# Patient Record
Sex: FEMALE | Race: WHITE | NOT HISPANIC OR LATINO | RURAL
[De-identification: names, ages, dates, MRNs, and addresses within clinical notes are randomized per-mention and may not be internally consistent; named-entity substitution may affect disease eponyms.]

---

## 2020-06-17 ENCOUNTER — OFFICE (OUTPATIENT)
Dept: RURAL CLINIC 3 | Facility: CLINIC | Age: 65
End: 2020-06-17
Payer: COMMERCIAL

## 2020-06-17 VITALS
HEIGHT: 66 IN | WEIGHT: 198 LBS | SYSTOLIC BLOOD PRESSURE: 151 MMHG | HEART RATE: 87 BPM | DIASTOLIC BLOOD PRESSURE: 85 MMHG

## 2020-06-17 DIAGNOSIS — K59.00 CONSTIPATION, UNSPECIFIED: ICD-10-CM

## 2020-06-17 DIAGNOSIS — R12 HEARTBURN: ICD-10-CM

## 2020-06-17 DIAGNOSIS — R10.11 RIGHT UPPER QUADRANT PAIN: ICD-10-CM

## 2020-06-17 PROCEDURE — 99203 OFFICE O/P NEW LOW 30 MIN: CPT | Performed by: NURSE PRACTITIONER

## 2020-06-17 RX ORDER — SORBITOL SOLUTION 70 %
SOLUTION, ORAL MISCELLANEOUS
Qty: 100 | Refills: 0 | Status: COMPLETED
Start: 2020-06-17 | End: 2020-07-29

## 2020-06-17 RX ORDER — OMEPRAZOLE 40 MG/1
40 CAPSULE, DELAYED RELEASE ORAL
Qty: 90 | Refills: 3 | Status: COMPLETED
Start: 2020-06-17 | End: 2023-05-23

## 2020-07-29 ENCOUNTER — OFFICE (OUTPATIENT)
Dept: RURAL CLINIC 3 | Facility: CLINIC | Age: 65
End: 2020-07-29
Payer: COMMERCIAL

## 2020-07-29 VITALS
HEART RATE: 86 BPM | SYSTOLIC BLOOD PRESSURE: 172 MMHG | HEIGHT: 66 IN | WEIGHT: 196 LBS | DIASTOLIC BLOOD PRESSURE: 109 MMHG

## 2020-07-29 DIAGNOSIS — R13.10 DYSPHAGIA, UNSPECIFIED: ICD-10-CM

## 2020-07-29 DIAGNOSIS — R10.11 RIGHT UPPER QUADRANT PAIN: ICD-10-CM

## 2020-07-29 DIAGNOSIS — R12 HEARTBURN: ICD-10-CM

## 2020-07-29 DIAGNOSIS — K59.00 CONSTIPATION, UNSPECIFIED: ICD-10-CM

## 2020-07-29 PROCEDURE — 99213 OFFICE O/P EST LOW 20 MIN: CPT | Performed by: NURSE PRACTITIONER

## 2021-07-21 ENCOUNTER — OFFICE (OUTPATIENT)
Dept: RURAL CLINIC 3 | Facility: CLINIC | Age: 66
End: 2021-07-21

## 2021-07-21 VITALS
HEIGHT: 66 IN | DIASTOLIC BLOOD PRESSURE: 75 MMHG | HEART RATE: 76 BPM | SYSTOLIC BLOOD PRESSURE: 138 MMHG | WEIGHT: 184 LBS

## 2021-07-21 DIAGNOSIS — R13.10 DYSPHAGIA, UNSPECIFIED: ICD-10-CM

## 2021-07-21 DIAGNOSIS — K59.00 CONSTIPATION, UNSPECIFIED: ICD-10-CM

## 2021-07-21 DIAGNOSIS — R12 HEARTBURN: ICD-10-CM

## 2021-07-21 PROCEDURE — 99214 OFFICE O/P EST MOD 30 MIN: CPT | Performed by: NURSE PRACTITIONER

## 2021-07-21 RX ORDER — OMEPRAZOLE 40 MG/1
40 CAPSULE, DELAYED RELEASE ORAL
Qty: 90 | Refills: 3 | Status: COMPLETED
Start: 2020-06-17 | End: 2023-05-23

## 2021-10-13 ENCOUNTER — ON CAMPUS - OUTPATIENT (OUTPATIENT)
Dept: RURAL HOSPITAL 3 | Facility: HOSPITAL | Age: 66
End: 2021-10-13

## 2021-10-13 DIAGNOSIS — R13.10 DYSPHAGIA, UNSPECIFIED: ICD-10-CM

## 2021-10-13 DIAGNOSIS — K31.7 POLYP OF STOMACH AND DUODENUM: ICD-10-CM

## 2021-10-13 DIAGNOSIS — K44.9 DIAPHRAGMATIC HERNIA WITHOUT OBSTRUCTION OR GANGRENE: ICD-10-CM

## 2021-10-13 DIAGNOSIS — K22.2 ESOPHAGEAL OBSTRUCTION: ICD-10-CM

## 2021-10-13 PROCEDURE — 43239 EGD BIOPSY SINGLE/MULTIPLE: CPT | Mod: 59 | Performed by: INTERNAL MEDICINE

## 2021-10-13 PROCEDURE — 43249 ESOPH EGD DILATION <30 MM: CPT | Performed by: INTERNAL MEDICINE

## 2021-11-09 ENCOUNTER — TRANSCRIBE ORDERS (OUTPATIENT)
Dept: ADMINISTRATIVE | Facility: HOSPITAL | Age: 66
End: 2021-11-09

## 2021-11-09 DIAGNOSIS — R13.10 DYSPHAGIA, UNSPECIFIED TYPE: Primary | ICD-10-CM

## 2021-11-16 DIAGNOSIS — R13.10 DYSPHAGIA, UNSPECIFIED TYPE: Primary | ICD-10-CM

## 2021-11-17 ENCOUNTER — LAB (OUTPATIENT)
Dept: LAB | Facility: HOSPITAL | Age: 66
End: 2021-11-17

## 2021-11-17 DIAGNOSIS — R13.10 DYSPHAGIA, UNSPECIFIED TYPE: ICD-10-CM

## 2021-11-17 PROCEDURE — C9803 HOPD COVID-19 SPEC COLLECT: HCPCS

## 2021-11-17 PROCEDURE — U0005 INFEC AGEN DETEC AMPLI PROBE: HCPCS

## 2021-11-17 PROCEDURE — U0004 COV-19 TEST NON-CDC HGH THRU: HCPCS

## 2021-11-18 ENCOUNTER — HOSPITAL ENCOUNTER (OUTPATIENT)
Dept: GENERAL RADIOLOGY | Facility: HOSPITAL | Age: 66
Discharge: HOME | End: 2021-11-18
Admitting: OTOLARYNGOLOGY

## 2021-11-18 DIAGNOSIS — R13.10 DYSPHAGIA, UNSPECIFIED TYPE: ICD-10-CM

## 2021-11-18 LAB — SARS-COV-2 ORF1AB RESP QL NAA+PROBE: NOT DETECTED

## 2021-11-18 PROCEDURE — 63710000001 BARIUM SULFATE 40 % SUSPENSION: Performed by: OTOLARYNGOLOGY

## 2021-11-18 PROCEDURE — 92611 MOTION FLUOROSCOPY/SWALLOW: CPT

## 2021-11-18 PROCEDURE — 74230 X-RAY XM SWLNG FUNCJ C+: CPT

## 2021-11-18 PROCEDURE — A9270 NON-COVERED ITEM OR SERVICE: HCPCS | Performed by: OTOLARYNGOLOGY

## 2021-11-18 RX ADMIN — BARIUM SULFATE 50 ML: 400 SUSPENSION ORAL at 10:09

## 2021-11-18 NOTE — MBS/VFSS/FEES
"Outpatient Speech Language Pathology   Adult Swallow Initial Evaluation   [facility]     Patient Name: Sneha Vega  : 1955  MRN: [id number]  Today's Date: 2021         Visit Date: 2021   There is no problem list on file for this patient.       No past medical history on file.     No past surgical history on file.      Visit Dx:     ICD-10-CM ICD-9-CM   1. Dysphagia, unspecified type  R13.10 787.20                SLP Adult Swallow Evaluation     Row Name 21 1200       Rehab Evaluation    Document Type evaluation  -EC    Subjective Information no complaints  -EC    Patient Observations alert; cooperative  -EC    Care Plan Review evaluation/treatment results reviewed  -EC    Care Plan Review, Other Participant(s) spouse  -EC    Patient Effort excellent  -EC    Symptoms Noted During/After Treatment none  -EC       General Information    Patient Profile Reviewed yes  -EC    Pertinent History Of Current Problem Pt is a 66 y.o. female who presents today for VFSS as referred by Dr. Laughlin from [facility]. Pt reports globus sensation \"most days,\" and specifies feeling on R side more often. Pt reprots she feels \"like there's a pocket there,\" and also coughs frequently w/liquids and her saliva stating it's going into her trachea. Pt reports hx of esophageal dilation approx 1 month ago with no improvement in swallow function. Pt does have hx of reflux and states she currently takes medicine for reflux. Pt reports no hx of pna, no hx of stroke, no hx of head or neck CA and no hx of cervical spine surgery.  -EC    Current Method of Nutrition regular textures; thin liquids  -EC    Precautions/Limitations, Vision WFL; for purposes of eval  -EC    Precautions/Limitations, Hearing WFL; for purposes of eval  -EC    Prior Level of Function-Communication WFL  -EC    Prior Level of Function-Swallowing no diet consistency restrictions; esophageal concerns  -EC    Plans/Goals Discussed with patient; " spouse/S.O.  -EC    Barriers to Rehab none identified  -EC    Patient's Goals for Discharge eat/drink without coughing/choking  -EC       Oral Motor Structure and Function    Dentition Assessment natural, present and adequate  -EC    Secretion Management WNL/WFL  -EC    Mucosal Quality moist, healthy  -EC       Oral Musculature and Cranial Nerve Assessment    Oral Motor General Assessment WFL  -EC       General Eating/Swallowing Observations    Respiratory Support Currently in Use room air  -EC    Eating/Swallowing Skills self-fed; appropriate self-feeding skills observed  -EC    Positioning During Eating --  standing, lateral view  -EC       MBS/VFSS    Utensils Used spoon; cup  -EC    Consistencies Trialed regular textures; chopped; mixed consistency; pureed; thin liquids  -EC       MBS/VFSS Interpretation    VFSS Summary VFSS completed w/trials of thin liquid by cup x3, puree applesauce x2, mechanical soft mixed consistency peaches x2, regular solid cracker w/barium paste x1 and a barium tablet w/thin water x1.  Pt's epiglottis appears slightly C-shaped resulting in very mild residue w/most consistencies though, again, this is mild. No vallecular residue was noted w/solid cracker and barium paste trials. Some narrowing w/mildly slow clearance of bolus trials at C4-C5 also noted possibly d/t cervical spurring, all trials do completely clear area though. Pt presents w/oral stage dysphagia w/tablet trial, w/significant difficulty w/A-P transit and pill sticking in valleculae requiring 3 swallows to clear. Overall, pt presents w/a functional oral and pharyngeal stage swallow for a regular consistency diet and thin liquids. Recommend pt take meds in liquid form or crush w/applesauce or yogurt if possible.   Detailed results as follows:   THIN: Timely initiation w/mild, transient penetration which completely clears the laryngeal vestibule w/completion of the swallow. No aspiration is seen w/this consistency. Pt w/very  mild vallecular residue following the swallow. PUREE: Timely swallow initiation w/no penetration or aspiration demonstrated. Very mild amount of residue remains in valleculae after the swallow which is not cleared by a dry swallow.   MECHANICAL SOFT MIXED CONSISTENCY: Mastication is WFL. Spillage to the valleculae noted before the swallow. Once initiated, no penetration or aspiration is seen w/this consistency. Trace vallecular residue noted w/this consistency.   CRACKER W/BARIUM PASTE: Mastication is WFL. Swallow is timely w/no penetration or aspiration of this consistency. No residue noted after the swallow.    BARIUM TABLET: Pt w/significant difficulty propelling tablet posteriorly despite multiple sips of water. Once propelled and swallow initiated tablet sticks in valleculae w/3 swallows to clear. A brief esophageal scan was completed at the end of the study which showed esophageal clearance to be WFL.  -EC       Clinical Impression    SLP Swallowing Diagnosis functional oral phase; functional pharyngeal phase  -EC    Functional Impact no impact on function  -EC       Recommendations    SLP Diet Recommendation regular textures; thin liquids  -EC          User Key  (r) = Recorded By, (t) = Taken By, (c) = Cosigned By    Initials Name Provider Type    EC Melanie Shafer Speech and Language Pathologist                                           Time Calculation:                     Melanie Shafer  11/18/2021

## 2022-08-10 ENCOUNTER — OFFICE (OUTPATIENT)
Dept: RURAL CLINIC 3 | Facility: CLINIC | Age: 67
End: 2022-08-10

## 2022-08-10 VITALS
HEART RATE: 86 BPM | HEIGHT: 66 IN | WEIGHT: 165 LBS | SYSTOLIC BLOOD PRESSURE: 167 MMHG | DIASTOLIC BLOOD PRESSURE: 99 MMHG

## 2022-08-10 DIAGNOSIS — K44.9 DIAPHRAGMATIC HERNIA WITHOUT OBSTRUCTION OR GANGRENE: ICD-10-CM

## 2022-08-10 DIAGNOSIS — R13.10 DYSPHAGIA, UNSPECIFIED: ICD-10-CM

## 2022-08-10 DIAGNOSIS — R05.3 CHRONIC COUGH: ICD-10-CM

## 2022-08-10 PROCEDURE — 99214 OFFICE O/P EST MOD 30 MIN: CPT | Performed by: INTERNAL MEDICINE

## 2022-11-07 ENCOUNTER — TRANSCRIBE ORDERS (OUTPATIENT)
Dept: ADMINISTRATIVE | Facility: HOSPITAL | Age: 67
End: 2022-11-07

## 2022-11-07 DIAGNOSIS — I10 ESSENTIAL HYPERTENSION, MALIGNANT: Primary | ICD-10-CM

## 2022-11-22 ENCOUNTER — APPOINTMENT (OUTPATIENT)
Dept: CARDIOLOGY | Facility: HOSPITAL | Age: 67
End: 2022-11-22

## 2023-04-30 ENCOUNTER — OFFICE (OUTPATIENT)
Dept: RURAL CLINIC 3 | Facility: CLINIC | Age: 68
End: 2023-04-30

## 2023-04-30 DIAGNOSIS — K21.00 GASTRO-ESOPHAGEAL REFLUX DISEASE WITH ESOPHAGITIS, WITHOUT B: ICD-10-CM

## 2023-04-30 PROCEDURE — 99457 RPM TX MGMT 1ST 20 MIN: CPT | Performed by: INTERNAL MEDICINE

## 2023-04-30 PROCEDURE — 99458 RPM TX MGMT EA ADDL 20 MIN: CPT | Performed by: INTERNAL MEDICINE

## 2023-04-30 PROCEDURE — 99426 PRIN CARE MGMT STAFF 1ST 30: CPT | Performed by: INTERNAL MEDICINE

## 2023-05-23 ENCOUNTER — OFFICE (OUTPATIENT)
Dept: URBAN - METROPOLITAN AREA CLINIC 64 | Facility: CLINIC | Age: 68
End: 2023-05-23

## 2023-05-23 VITALS
HEART RATE: 85 BPM | DIASTOLIC BLOOD PRESSURE: 70 MMHG | WEIGHT: 179 LBS | SYSTOLIC BLOOD PRESSURE: 115 MMHG | HEIGHT: 66 IN

## 2023-05-23 DIAGNOSIS — R19.7 DIARRHEA, UNSPECIFIED: ICD-10-CM

## 2023-05-23 PROCEDURE — 99213 OFFICE O/P EST LOW 20 MIN: CPT | Performed by: NURSE PRACTITIONER

## 2023-05-31 ENCOUNTER — OFFICE (OUTPATIENT)
Dept: URBAN - METROPOLITAN AREA CLINIC 64 | Facility: CLINIC | Age: 68
End: 2023-05-31

## 2023-05-31 DIAGNOSIS — K21.00 GASTRO-ESOPHAGEAL REFLUX DISEASE WITH ESOPHAGITIS, WITHOUT B: ICD-10-CM

## 2023-05-31 DIAGNOSIS — E66.9 OBESITY, UNSPECIFIED: ICD-10-CM

## 2023-05-31 PROCEDURE — 99453 REM MNTR PHYSIOL PARAM SETUP: CPT | Performed by: INTERNAL MEDICINE

## 2023-05-31 PROCEDURE — 99426 PRIN CARE MGMT STAFF 1ST 30: CPT | Performed by: INTERNAL MEDICINE

## 2023-05-31 PROCEDURE — 99427 PRIN CARE MGMT STAFF EA ADDL: CPT | Performed by: INTERNAL MEDICINE

## 2023-05-31 PROCEDURE — 99458 RPM TX MGMT EA ADDL 20 MIN: CPT | Performed by: INTERNAL MEDICINE

## 2023-05-31 PROCEDURE — 99457 RPM TX MGMT 1ST 20 MIN: CPT | Performed by: INTERNAL MEDICINE

## 2023-06-09 ENCOUNTER — OFFICE (OUTPATIENT)
Dept: URBAN - METROPOLITAN AREA PATHOLOGY 4 | Facility: PATHOLOGY | Age: 68
End: 2023-06-09

## 2023-06-09 ENCOUNTER — ON CAMPUS - OUTPATIENT (OUTPATIENT)
Dept: URBAN - METROPOLITAN AREA HOSPITAL 2 | Facility: HOSPITAL | Age: 68
End: 2023-06-09

## 2023-06-09 ENCOUNTER — OFFICE (OUTPATIENT)
Dept: URBAN - METROPOLITAN AREA PATHOLOGY 4 | Facility: PATHOLOGY | Age: 68
End: 2023-06-09
Payer: COMMERCIAL

## 2023-06-09 VITALS
DIASTOLIC BLOOD PRESSURE: 54 MMHG | OXYGEN SATURATION: 98 % | SYSTOLIC BLOOD PRESSURE: 88 MMHG | HEART RATE: 82 BPM | RESPIRATION RATE: 18 BRPM | HEART RATE: 71 BPM | TEMPERATURE: 96.9 F | SYSTOLIC BLOOD PRESSURE: 87 MMHG | WEIGHT: 171 LBS | DIASTOLIC BLOOD PRESSURE: 85 MMHG | RESPIRATION RATE: 16 BRPM | OXYGEN SATURATION: 97 % | SYSTOLIC BLOOD PRESSURE: 70 MMHG | SYSTOLIC BLOOD PRESSURE: 86 MMHG | DIASTOLIC BLOOD PRESSURE: 55 MMHG | OXYGEN SATURATION: 100 % | OXYGEN SATURATION: 99 % | DIASTOLIC BLOOD PRESSURE: 67 MMHG | HEART RATE: 74 BPM | SYSTOLIC BLOOD PRESSURE: 79 MMHG | HEART RATE: 102 BPM | SYSTOLIC BLOOD PRESSURE: 108 MMHG | DIASTOLIC BLOOD PRESSURE: 42 MMHG | HEIGHT: 66 IN | DIASTOLIC BLOOD PRESSURE: 47 MMHG | HEART RATE: 73 BPM | DIASTOLIC BLOOD PRESSURE: 48 MMHG | SYSTOLIC BLOOD PRESSURE: 146 MMHG

## 2023-06-09 DIAGNOSIS — K52.832 LYMPHOCYTIC COLITIS: ICD-10-CM

## 2023-06-09 DIAGNOSIS — K63.5 POLYP OF COLON: ICD-10-CM

## 2023-06-09 DIAGNOSIS — R19.7 DIARRHEA, UNSPECIFIED: ICD-10-CM

## 2023-06-09 DIAGNOSIS — R19.4 CHANGE IN BOWEL HABIT: ICD-10-CM

## 2023-06-09 DIAGNOSIS — K64.1 SECOND DEGREE HEMORRHOIDS: ICD-10-CM

## 2023-06-09 LAB
GI HISTOLOGY: A. UNSPECIFIED: (no result)
GI HISTOLOGY: B. UNSPECIFIED: (no result)
GI HISTOLOGY: PDF REPORT: (no result)

## 2023-06-09 PROCEDURE — 88305 TISSUE EXAM BY PATHOLOGIST: CPT | Mod: 26 | Performed by: INTERNAL MEDICINE

## 2023-06-09 PROCEDURE — 45380 COLONOSCOPY AND BIOPSY: CPT | Performed by: INTERNAL MEDICINE

## 2023-07-05 ENCOUNTER — OFFICE (OUTPATIENT)
Dept: URBAN - METROPOLITAN AREA CLINIC 64 | Facility: CLINIC | Age: 68
End: 2023-07-05

## 2023-07-05 VITALS
WEIGHT: 168 LBS | DIASTOLIC BLOOD PRESSURE: 79 MMHG | SYSTOLIC BLOOD PRESSURE: 122 MMHG | HEART RATE: 84 BPM | HEIGHT: 66 IN

## 2023-07-05 DIAGNOSIS — K52.832 LYMPHOCYTIC COLITIS: ICD-10-CM

## 2023-07-05 DIAGNOSIS — R05.3 CHRONIC COUGH: ICD-10-CM

## 2023-07-05 DIAGNOSIS — R13.10 DYSPHAGIA, UNSPECIFIED: ICD-10-CM

## 2023-07-05 PROCEDURE — 99212 OFFICE O/P EST SF 10 MIN: CPT | Performed by: NURSE PRACTITIONER

## 2023-10-16 ENCOUNTER — OFFICE VISIT (OUTPATIENT)
Dept: CARDIOLOGY | Facility: CLINIC | Age: 68
End: 2023-10-16
Payer: MEDICARE

## 2023-10-16 VITALS
HEART RATE: 79 BPM | BODY MASS INDEX: 28.88 KG/M2 | OXYGEN SATURATION: 98 % | DIASTOLIC BLOOD PRESSURE: 80 MMHG | WEIGHT: 184 LBS | SYSTOLIC BLOOD PRESSURE: 121 MMHG | HEIGHT: 67 IN

## 2023-10-16 DIAGNOSIS — I10 PRIMARY HYPERTENSION: ICD-10-CM

## 2023-10-16 DIAGNOSIS — R06.02 SHORTNESS OF BREATH: Primary | ICD-10-CM

## 2023-10-16 DIAGNOSIS — E11.9 TYPE 2 DIABETES MELLITUS WITHOUT COMPLICATION, WITHOUT LONG-TERM CURRENT USE OF INSULIN: ICD-10-CM

## 2023-10-16 DIAGNOSIS — E78.00 PURE HYPERCHOLESTEROLEMIA: ICD-10-CM

## 2023-10-16 PROCEDURE — 99204 OFFICE O/P NEW MOD 45 MIN: CPT | Performed by: INTERNAL MEDICINE

## 2023-10-16 PROCEDURE — 93000 ELECTROCARDIOGRAM COMPLETE: CPT | Performed by: INTERNAL MEDICINE

## 2023-10-16 PROCEDURE — 1159F MED LIST DOCD IN RCRD: CPT | Performed by: INTERNAL MEDICINE

## 2023-10-16 PROCEDURE — 1160F RVW MEDS BY RX/DR IN RCRD: CPT | Performed by: INTERNAL MEDICINE

## 2023-10-16 RX ORDER — BLOOD-GLUCOSE METER
1 KIT MISCELLANEOUS AS NEEDED
COMMUNITY
Start: 2023-09-01

## 2023-10-16 RX ORDER — AMLODIPINE BESYLATE 2.5 MG/1
2.5 TABLET ORAL DAILY
COMMUNITY
Start: 2023-09-05

## 2023-10-16 RX ORDER — OMEPRAZOLE 40 MG/1
40 CAPSULE, DELAYED RELEASE ORAL DAILY
COMMUNITY

## 2023-10-16 RX ORDER — GLIMEPIRIDE 1 MG/1
1 TABLET ORAL
COMMUNITY
Start: 2023-08-28

## 2023-10-16 RX ORDER — CHLORAL HYDRATE 500 MG
1000 CAPSULE ORAL DAILY
COMMUNITY

## 2023-10-16 RX ORDER — ACETAMINOPHEN 160 MG
2000 TABLET,DISINTEGRATING ORAL DAILY
COMMUNITY

## 2023-10-16 RX ORDER — HYDROCHLOROTHIAZIDE 25 MG/1
25 TABLET ORAL DAILY
COMMUNITY

## 2023-10-16 RX ORDER — FLUOROURACIL 50 MG/G
1 CREAM TOPICAL DAILY
COMMUNITY
Start: 2023-10-12

## 2023-10-16 RX ORDER — LISINOPRIL 40 MG/1
40 TABLET ORAL DAILY
COMMUNITY
Start: 2023-10-09

## 2023-10-16 RX ORDER — CLONAZEPAM 0.5 MG/1
0.5 TABLET ORAL 2 TIMES DAILY PRN
COMMUNITY

## 2023-10-16 RX ORDER — DIPHENOXYLATE HYDROCHLORIDE AND ATROPINE SULFATE 2.5; .025 MG/1; MG/1
1 TABLET ORAL DAILY
COMMUNITY

## 2023-10-16 RX ORDER — VALACYCLOVIR HYDROCHLORIDE 1 G/1
1000 TABLET, FILM COATED ORAL DAILY
COMMUNITY
Start: 2023-10-12

## 2023-10-16 RX ORDER — TRIAMCINOLONE ACETONIDE 1 MG/G
1 CREAM TOPICAL 2 TIMES DAILY
COMMUNITY
Start: 2023-10-12

## 2023-10-16 NOTE — PROGRESS NOTES
"    Subjective:     Encounter Date:10/16/2023      Patient ID: Sneha Vega is a 68 y.o. female.    Chief Complaint:  History of Present Illness 68-year-old white female with history of hypertension diabetes hyperlipidemia presents to my office for a new consultation.  Patient has been having symptoms of shortness of breath along with occasional palpitations and dizziness.  No complaint of any chest pain.  No PND orthopnea.  No swelling of the feet.  She has been followed by cardiologist in the past and had a work-up done with an echo and a stress test at the results of which are not available.  She does not smoke.  /80   Pulse 79   Ht 170.2 cm (67\")   Wt 83.5 kg (184 lb)   SpO2 98%   BMI 28.82 kg/m²     The following portions of the patient's history were reviewed and updated as appropriate: allergies, current medications, past family history, past medical history, past social history, past surgical history, and problem list.  Past Medical History:   Diagnosis Date    Heart murmur     Hyperlipidemia     Hypertension      History reviewed. No pertinent surgical history.  Social History     Socioeconomic History    Marital status:    Tobacco Use    Smoking status: Former     Packs/day: 0.50     Years: 5.00     Additional pack years: 0.00     Total pack years: 2.50     Types: Cigarettes     Passive exposure: Never    Smokeless tobacco: Never   Vaping Use    Vaping Use: Never used   Substance and Sexual Activity    Alcohol use: Defer    Drug use: Never    Sexual activity: Defer     Family History   Problem Relation Age of Onset    Hypertension Mother     Heart failure Father     Heart murmur Sister     No Known Problems Brother        Current Outpatient Medications:     amLODIPine (NORVASC) 2.5 MG tablet, Take 1 tablet by mouth Daily., Disp: , Rfl:     Cholecalciferol (Vitamin D3) 50 MCG (2000 UT) capsule, Take 1 capsule by mouth Daily., Disp: , Rfl:     clonazePAM (KlonoPIN) 0.5 MG tablet, Take 1 " tablet by mouth 2 (Two) Times a Day As Needed., Disp: , Rfl:     fluorouracil (EFUDEX) 5 % cream, Apply 1 application  topically to the appropriate area as directed Daily., Disp: , Rfl:     FREESTYLE LITE test strip, 1 each by Other route As Needed., Disp: , Rfl:     glimepiride (AMARYL) 1 MG tablet, Take 1 tablet by mouth Every Morning Before Breakfast., Disp: , Rfl:     hydroCHLOROthiazide (HYDRODIURIL) 25 MG tablet, Take 1 tablet by mouth Daily., Disp: , Rfl:     lisinopril (PRINIVIL,ZESTRIL) 40 MG tablet, Take 1 tablet by mouth Daily., Disp: , Rfl:     multivitamin (THERAGRAN) tablet tablet, Take 1 tablet by mouth Daily., Disp: , Rfl:     Omega-3 Fatty Acids (fish oil) 1000 MG capsule capsule, Take 1 capsule by mouth Daily., Disp: , Rfl:     omeprazole (priLOSEC) 40 MG capsule, Take 1 capsule by mouth Daily., Disp: , Rfl:     psyllium (METAMUCIL) 0.52 g capsule, Take 1 capsule by mouth Daily., Disp: , Rfl:     triamcinolone (KENALOG) 0.1 % cream, Apply 1 application  topically to the appropriate area as directed 2 (Two) Times a Day., Disp: , Rfl:     valACYclovir (VALTREX) 1000 MG tablet, Take 1 tablet by mouth Daily., Disp: , Rfl:   Allergies   Allergen Reactions    Acyclovir Hives and Rash       Review of Systems   Constitutional: Positive for malaise/fatigue. Negative for fever.   HENT:  Negative for ear pain and nosebleeds.    Eyes:  Negative for blurred vision and double vision.   Cardiovascular:  Positive for palpitations. Negative for chest pain, dyspnea on exertion and leg swelling.   Respiratory:  Positive for shortness of breath. Negative for cough.    Skin:  Negative for rash.   Musculoskeletal:  Negative for joint pain.   Gastrointestinal:  Negative for abdominal pain, nausea and vomiting.   Neurological:  Positive for dizziness and light-headedness. Negative for focal weakness, headaches and numbness.   Psychiatric/Behavioral:  Negative for depression. The patient is not nervous/anxious.    All  other systems reviewed and are negative.             Objective:     Constitutional:       Appearance: Well-developed.   Eyes:      General: No scleral icterus.     Conjunctiva/sclera: Conjunctivae normal.      Pupils: Pupils are equal, round, and reactive to light.   HENT:      Head: Normocephalic and atraumatic.   Neck:      Vascular: No carotid bruit or JVD.   Pulmonary:      Effort: Pulmonary effort is normal.      Breath sounds: Normal breath sounds. No wheezing. No rales.   Cardiovascular:      Normal rate. Regular rhythm.   Pulses:     Intact distal pulses.   Abdominal:      General: Bowel sounds are normal.      Palpations: Abdomen is soft.   Musculoskeletal: Normal range of motion.      Cervical back: Normal range of motion and neck supple. Skin:     General: Skin is warm and dry.      Findings: No rash.   Neurological:      Mental Status: Alert.      Comments: No focal deficits           ECG 12 Lead    Date/Time: 10/16/2023 1:29 PM  Performed by: Fadi Zabala MD    Authorized by: Fadi Zabala MD  Comments: Sinus rhythm  Low voltage QRS complexes significant for pulmonary disease  Abnormal EKG  No previous EKGs available          Lab Review:       Assessment:          Diagnosis Plan   1. Shortness of breath        2. Primary hypertension        3. Type 2 diabetes mellitus without complication, without long-term current use of insulin        4. Pure hypercholesterolemia               Plan:       Patient presents with shortness of breath and had COVID infection in the past but had an echocardiogram also the results of which are not available  Patient also had a stress Myoview study and I will obtain the results  Patient blood pressure is currently stable on amlodipine and lisinopril  Patient's sugar levels are followed by primary care doctor and is on medical therapy  Patient also is on a statin and her cholesterol levels are followed by the primary care doctor.  Based on the test results if she did not  have a Holter monitor then will perform a Holter monitor for 5 to 7 days.

## 2023-10-17 ENCOUNTER — TELEPHONE (OUTPATIENT)
Dept: CARDIOLOGY | Facility: CLINIC | Age: 68
End: 2023-10-17
Payer: MEDICARE

## 2023-10-17 NOTE — TELEPHONE ENCOUNTER
ECG 12 Lead (10/16/2023 13:29)     Patient called our office and wanted to speak one on one about her EKG. Her My chart showed a message this morning that her EKG results were abnormal.

## 2023-10-23 ENCOUNTER — PATIENT ROUNDING (BHMG ONLY) (OUTPATIENT)
Dept: CARDIOLOGY | Facility: CLINIC | Age: 68
End: 2023-10-23
Payer: MEDICARE

## 2023-11-27 ENCOUNTER — SPECIALTY PHARMACY (OUTPATIENT)
Dept: ENDOCRINOLOGY | Facility: CLINIC | Age: 68
End: 2023-11-27
Payer: MEDICARE

## 2023-11-27 ENCOUNTER — OFFICE VISIT (OUTPATIENT)
Dept: ENDOCRINOLOGY | Facility: CLINIC | Age: 68
End: 2023-11-27
Payer: MEDICARE

## 2023-11-27 VITALS
SYSTOLIC BLOOD PRESSURE: 132 MMHG | OXYGEN SATURATION: 99 % | BODY MASS INDEX: 29.82 KG/M2 | HEART RATE: 78 BPM | WEIGHT: 190 LBS | DIASTOLIC BLOOD PRESSURE: 75 MMHG | HEIGHT: 67 IN

## 2023-11-27 DIAGNOSIS — E11.65 TYPE 2 DIABETES MELLITUS WITH HYPERGLYCEMIA, WITHOUT LONG-TERM CURRENT USE OF INSULIN: Primary | ICD-10-CM

## 2023-11-27 DIAGNOSIS — I10 ESSENTIAL HYPERTENSION: ICD-10-CM

## 2023-11-27 DIAGNOSIS — E78.2 MIXED HYPERLIPIDEMIA: ICD-10-CM

## 2023-11-27 LAB — GLUCOSE BLDC GLUCOMTR-MCNC: 100 MG/DL (ref 70–105)

## 2023-11-27 PROCEDURE — 1160F RVW MEDS BY RX/DR IN RCRD: CPT | Performed by: INTERNAL MEDICINE

## 2023-11-27 PROCEDURE — 1159F MED LIST DOCD IN RCRD: CPT | Performed by: INTERNAL MEDICINE

## 2023-11-27 PROCEDURE — 99204 OFFICE O/P NEW MOD 45 MIN: CPT | Performed by: INTERNAL MEDICINE

## 2023-11-27 PROCEDURE — 3078F DIAST BP <80 MM HG: CPT | Performed by: INTERNAL MEDICINE

## 2023-11-27 PROCEDURE — 82948 REAGENT STRIP/BLOOD GLUCOSE: CPT | Performed by: INTERNAL MEDICINE

## 2023-11-27 PROCEDURE — 3075F SYST BP GE 130 - 139MM HG: CPT | Performed by: INTERNAL MEDICINE

## 2023-11-27 NOTE — PROGRESS NOTES
Endocrine Consult Outpatient  Referred by Ms. Zarate for uncontrolled diabetes consult  Patient Care Team:  April Zarate APRN as PCP - General (Nurse Practitioner)  Fadi Zabala MD as Consulting Physician (Cardiology)     Chief Complaint: Uncontrolled diabetes         HPI: This is a 68-year-old female with history of type 2 diabetes, hypertension and hyperlipidemia is referred for diabetes evaluation management.    For type 2 diabetes: Initial diagnosis was in October 2020.  She has not done any formal diabetes education.  She does have a glucometer and has been checking blood sugars at least once a day and mostly running less than 150.  She is currently on glimepiride 1 mg p.o. daily which was restarted about 3 months ago by her previous endocrinologist Dr. Winslow.  At that time Farxiga was prescribed and was cost prohibitive.  She is interested in trying one of the newer medications at this time.  She is trying to pay attention to her diet even though she did not have formal diabetes education.    Hypertension: Well-controlled    Hyperlipidemia: She is currently not taking any lipid-lowering medications.    Past Medical History:   Diagnosis Date    Heart murmur     Hyperlipidemia     Hypertension        Social History     Socioeconomic History    Marital status:     Number of children: 1    Years of education: 12    Highest education level: Bachelor's degree (e.g., BA, AB, BS)   Tobacco Use    Smoking status: Former     Packs/day: 0.50     Years: 5.00     Additional pack years: 0.00     Total pack years: 2.50     Types: Cigarettes     Passive exposure: Never    Smokeless tobacco: Never   Vaping Use    Vaping Use: Never used   Substance and Sexual Activity    Alcohol use: Not Currently    Drug use: Never    Sexual activity: Defer       Family History   Problem Relation Age of Onset    Hypertension Mother     Heart failure Father     Stroke Father     Hyperlipidemia Father     Cancer Sister         skin  "   Heart murmur Sister     No Known Problems Brother     Diabetes Maternal Grandfather        Allergies   Allergen Reactions    Acyclovir Hives and Rash       ROS:   Constitutional:  Admit fatigue, tiredness.    Eyes:  Denies change in visual acuity   HENT:  Denies nasal congestion or sore throat   Respiratory: denies cough, shortness of breath.   Cardiovascular:  denies chest pain, edema   GI:  Denies abdominal pain, nausea, vomiting.    :  Denies dysuria   Musculoskeletal:  Denies back pain or joint pain   Integument:  Denies dry skin, rash   Neurologic:  Denies headache, focal weakness or sensory changes   Endocrine:  Denies polyuria or polydipsia   Psychiatric:  Denies depression or anxiety      Vitals:    11/27/23 1333   BP: 132/75   Pulse: 78   SpO2: 99%     Body mass index is 29.76 kg/m².      Physical Exam:  GEN: NAD, conversant  EYES: EOMI, PERRL  NECK: no thyromegaly, full ROM   CV: RRR  LUNG: CTA  SKIN: no rashes, no acanthosis  MSK: no deformities,   NEURO: no tremors, DTR normal  PSYCH: Awake and coherent      Results Review:     I reviewed the patient's new clinical results.    No recent labs.     No results found for: \"GLUCOSE\", \"BUN\", \"CREATININE\", \"EGFRIFNONA\", \"EGFRIFAFRI\", \"BCR\", \"K\", \"CO2\", \"CALCIUM\", \"PROTENTOTREF\", \"ALBUMIN\", \"LABIL2\", \"BILIRUBIN\", \"AST\", \"ALT\", \"CHOL\", \"TRIG\", \"HDL\", \"LDL\"  No results found for: \"HGBA1C\"  No results found for: \"GLUF\", \"MICROALBUR\", \"CREATININE\"  No results found for: \"TSH\", \"FREET4\", \"THYROIDAB\"    Medication Review: Reviewed.       Current Outpatient Medications:     amLODIPine (NORVASC) 2.5 MG tablet, Take 1 tablet by mouth Daily., Disp: , Rfl:     Cholecalciferol (Vitamin D3) 50 MCG (2000 UT) capsule, Take 1 capsule by mouth Daily., Disp: , Rfl:     clonazePAM (KlonoPIN) 0.5 MG tablet, Take 1 tablet by mouth 2 (Two) Times a Day As Needed., Disp: , Rfl:     fluorouracil (EFUDEX) 5 % cream, Apply 1 application  topically to the appropriate area as directed " Daily., Disp: , Rfl:     FREESTYLE LITE test strip, 1 each by Other route As Needed., Disp: , Rfl:     glimepiride (AMARYL) 1 MG tablet, Take 1 tablet by mouth Every Morning Before Breakfast., Disp: , Rfl:     hydroCHLOROthiazide (HYDRODIURIL) 25 MG tablet, Take 1 tablet by mouth Daily., Disp: , Rfl:     lisinopril (PRINIVIL,ZESTRIL) 40 MG tablet, Take 1 tablet by mouth Daily., Disp: , Rfl:     multivitamin (THERAGRAN) tablet tablet, Take 1 tablet by mouth Daily., Disp: , Rfl:     Omega-3 Fatty Acids (fish oil) 1000 MG capsule capsule, Take 1 capsule by mouth Daily., Disp: , Rfl:     omeprazole (priLOSEC) 40 MG capsule, Take 1 capsule by mouth Daily., Disp: , Rfl:     psyllium (METAMUCIL) 0.52 g capsule, Take 1 capsule by mouth Daily., Disp: , Rfl:         Assessment and plan:  Diabetes mellitus type 2 with hyperglycemia: At this time I will check A1c.  I will DC glimepiride and we will put her on Jardiance 10 mg once a day.  She will start that in about February of next year.  She will continue glimepiride till then.  I will refer her for comprehensive diabetes education.  She is advised to always keep glucose source in case of low blood sugars.    Hypertension: Well-controlled    Hyperlipidemia: Currently not taking any lipid-lowering medications, will check lipid panel and then make further recommendations.    Thank you very much for the consultation.    Prema Snow MD FACE.

## 2023-11-27 NOTE — PROGRESS NOTES
Benefits Investigation Summary    Prescription: New Therapy- jardiance    PLAN: RX Magoa D  BIN: [id number]  PCN: [id number]  RX GROUP:     Prior Auth and Med Assistance notes: Discussed patient assistance with patient. It sounds like she may qualify for patient assistance. Patient is going to check her annual household income and get back with me if the cost is under the requirements. We discussed that we may need to switch to farxiga as they allow a higher annual income ($43,785) for a household of one. Otherwise, the patient states that her insurance may be changing after the first of the year.  Provided patient with my contact info and will wait to hear from her to move forward with the patient assistance application.    Discussed what to expect with the medication:  JARDIANCE® (empagliflozin)  Medication Expectations   Why am I taking this medication? You are taking this medication to lower blood sugar because you have type 2 diabetes. Diabetes is not curable but with proper medication and treatment, we can keep your blood sugar within your personalized target range. This medication also helps reduce the risk of death from heart attack or stroke if you have heart disease and type 2 diabetes.   What should I expect while on this medication? You should expect to see your blood sugar and A1c decrease over time. You may also see a decrease in your blood pressure and it can help some people lose weight.     How does the medication work? Jardiance works by helping to remove some sugar that the body doesn't need through urination.    How long will I be on this medication for? The amount of time you will be on this medication will be determined by your doctor based on blood sugar and A1c control. You will most likely be on this medication or another diabetes medication throughout your lifetime. Do not abruptly stop this medication without talking to your doctor first.    How do I take this medication? Take as directed on  your prescription label. This medication is usually taken in the morning and can be given with or without food.    What are some possible side effects? You may notice increased urination, especially when you first start Jardiance. The most common side effects are urinary tract infections and yeast infections and are more commonly seen in females. Talk with your doctor if you notice white or yellow vaginal discharge, vaginal itching or odor of if you notice redness, itching, pain, or swelling of the penis and/or bad-smelling discharge from the penis.    What happens if I miss a dose? If you miss a dose, take it as soon as you remember. If it is close to your next dose, skip it (do not take 2 doses at once)     Medication Safety   What are things I should warn my doctor immediately about? Tell your doctor if you have kidney disease, liver disease, heart failure, pancreas problems, or history of frequent genital yeast or urinary tract infections. Tell your doctor if you are on a low-salt diet, if you drink alcohol, or if you are having surgery. Talk to your doctor if you are pregnant, planning to become pregnant, or breastfeeding. Also tell your doctor if you notice any signs/symptoms of an allergic reaction (rash, hives, difficulty breathing, etc.).   What are things that I should be cautious of? Be cautious of any side effects from this medication. Talk to your doctor if any new ones develop or aren't getting better.   What are some medications that can interact with this one? Some medications that interact include diuretics (water pills) and other medications that may also lower your blood sugar such as insulins and glipizide/glimepiride/glyburide. Your doctor may reduce the dose of these medications when you start Jardiance to minimize low blood sugars. Always tell your doctor or pharmacist immediately if you start taking any new medications, including over-the-counter medications, vitamins, and herbal supplements.       Medication Storage/Handling   How should I handle this medication? Keep this medication out of reach of pets/children in tightly sealed container   How does this medication need to be stored? Store at room temperature and keep dry (don't keep in bathroom or other room with moisture)   How should I dispose of this medication? There should not be a need to dispose of this medication unless your provider decides to change the dose or therapy. If that is the case, take to your local police station for proper disposal. Some pharmacies also have take-back bins for medication drop-off.      Resources/Support   How can I remind myself to take this medication? You can download reminder apps to help you manage your refills. You may also set an alarm on your phone to remind you. The pharmacy carries pill boxes that you can place next to an area you pass everyday (such as where you place your car keys or where you charge your phone)   Is financial support available?  [employer]) can provide co-pay cards if you have commercial insurance or patient assistance if you have Medicare or no insurance.    Which vaccines are recommended for me? Talk to your doctor about these vaccines: Flu, Coronavirus (COVID-19), Pneumococcal (pneumonia), Tdap, Hepatitis B, Zoster (shingles)      Answered all questions and concerns at this time.    Eva Yeung, PharmD  Clinical Specialty Pharmacist, Endocrinology  11/27/2023  15:40 EST

## 2023-11-27 NOTE — PATIENT INSTRUCTIONS
DC glimepiride when you are done and start Jardiance 10 mg p.o. daily  Arrange for comprehensive diabetes education  Continue to work on diet and activity  Always keep glucose source in case of low blood sugar  Labs fasting in the next few days

## 2023-11-30 ENCOUNTER — TELEPHONE (OUTPATIENT)
Dept: CARDIOLOGY | Facility: CLINIC | Age: 68
End: 2023-11-30
Payer: MEDICARE

## 2023-11-30 NOTE — TELEPHONE ENCOUNTER
Please call patient and let her know those blood pressure readings look great.  We will continue current medical therapy at this time.  Thank you let me know if she has any further questions or concerns.

## 2023-11-30 NOTE — TELEPHONE ENCOUNTER
Patient called and gave blood pressure readings she is currently taking Amlodipine 2.5 mg daily, lisinopril 40 mg daily, and hydrochlorothiazide 25 mg daily.         130/79  121/78  104/66  112/78  113/77  118/75  110/81  109/66  111/77  106/74  116/77  122/76  114/77

## 2023-12-01 ENCOUNTER — PATIENT ROUNDING (BHMG ONLY) (OUTPATIENT)
Dept: ENDOCRINOLOGY | Facility: CLINIC | Age: 68
End: 2023-12-01
Payer: MEDICARE

## 2023-12-01 ENCOUNTER — LAB (OUTPATIENT)
Dept: LAB | Facility: HOSPITAL | Age: 68
End: 2023-12-01
Payer: MEDICARE

## 2023-12-01 DIAGNOSIS — E11.65 TYPE 2 DIABETES MELLITUS WITH HYPERGLYCEMIA, WITHOUT LONG-TERM CURRENT USE OF INSULIN: ICD-10-CM

## 2023-12-01 LAB
ALBUMIN SERPL-MCNC: 4.6 G/DL (ref 3.5–5.2)
ALBUMIN UR-MCNC: <1.2 MG/DL
ALBUMIN/GLOB SERPL: 1.7 G/DL
ALP SERPL-CCNC: 72 U/L (ref 39–117)
ALT SERPL W P-5'-P-CCNC: 15 U/L (ref 1–33)
ANION GAP SERPL CALCULATED.3IONS-SCNC: 8 MMOL/L (ref 5–15)
AST SERPL-CCNC: 15 U/L (ref 1–32)
BILIRUB SERPL-MCNC: 0.2 MG/DL (ref 0–1.2)
BUN SERPL-MCNC: 20 MG/DL (ref 8–23)
BUN/CREAT SERPL: 22.7 (ref 7–25)
CALCIUM SPEC-SCNC: 9.8 MG/DL (ref 8.6–10.5)
CHLORIDE SERPL-SCNC: 99 MMOL/L (ref 98–107)
CHOLEST SERPL-MCNC: 227 MG/DL (ref 0–200)
CO2 SERPL-SCNC: 28 MMOL/L (ref 22–29)
CREAT SERPL-MCNC: 0.88 MG/DL (ref 0.57–1)
CREAT UR-MCNC: 60.4 MG/DL
EGFRCR SERPLBLD CKD-EPI 2021: 71.7 ML/MIN/1.73
GLOBULIN UR ELPH-MCNC: 2.7 GM/DL
GLUCOSE SERPL-MCNC: 154 MG/DL (ref 65–99)
HBA1C MFR BLD: 7 % (ref 4.8–5.6)
HDLC SERPL-MCNC: 57 MG/DL (ref 40–60)
LDLC SERPL CALC-MCNC: 148 MG/DL (ref 0–100)
LDLC/HDLC SERPL: 2.55 {RATIO}
MICROALBUMIN/CREAT UR: NORMAL MG/G{CREAT}
POTASSIUM SERPL-SCNC: 4.8 MMOL/L (ref 3.5–5.2)
PROT SERPL-MCNC: 7.3 G/DL (ref 6–8.5)
SODIUM SERPL-SCNC: 135 MMOL/L (ref 136–145)
TRIGL SERPL-MCNC: 122 MG/DL (ref 0–150)
VLDLC SERPL-MCNC: 22 MG/DL (ref 5–40)

## 2023-12-01 PROCEDURE — 80053 COMPREHEN METABOLIC PANEL: CPT

## 2023-12-01 PROCEDURE — 82570 ASSAY OF URINE CREATININE: CPT

## 2023-12-01 PROCEDURE — 36415 COLL VENOUS BLD VENIPUNCTURE: CPT

## 2023-12-01 PROCEDURE — 80061 LIPID PANEL: CPT

## 2023-12-01 PROCEDURE — 83036 HEMOGLOBIN GLYCOSYLATED A1C: CPT

## 2023-12-01 PROCEDURE — 82043 UR ALBUMIN QUANTITATIVE: CPT

## 2023-12-01 NOTE — PROGRESS NOTES
December 1, 2023    Hello, may I speak with Sneha Vega?    My name is Shari      I am  with [facility] [place] [place] [facility]  [address] [place] [id number].    Before we get started may I verify your date of birth? 1955    I am calling to officially welcome you to our practice and ask about your recent visit. Is this a good time to talk? yes    Tell me about your visit with us. What things went well?  it was ok       We're always looking for ways to make our patients' experiences even better. Do you have recommendations on ways we may improve?  no    Overall were you satisfied with your first visit to our practice? yes       I appreciate you taking the time to speak with me today. Is there anything else I can do for you? no      Thank you, and have a great day.

## 2023-12-14 ENCOUNTER — OFFICE (OUTPATIENT)
Dept: URBAN - METROPOLITAN AREA CLINIC 64 | Facility: CLINIC | Age: 68
End: 2023-12-14

## 2023-12-14 ENCOUNTER — TRANSCRIBE ORDERS (OUTPATIENT)
Dept: ADMINISTRATIVE | Facility: HOSPITAL | Age: 68
End: 2023-12-14
Payer: MEDICARE

## 2023-12-14 VITALS
WEIGHT: 194 LBS | DIASTOLIC BLOOD PRESSURE: 72 MMHG | HEIGHT: 66 IN | HEART RATE: 73 BPM | SYSTOLIC BLOOD PRESSURE: 103 MMHG

## 2023-12-14 DIAGNOSIS — K44.9 HIATAL HERNIA: ICD-10-CM

## 2023-12-14 DIAGNOSIS — R13.10 DYSPHAGIA, UNSPECIFIED TYPE: Primary | ICD-10-CM

## 2023-12-14 DIAGNOSIS — R13.10 DYSPHAGIA, UNSPECIFIED: ICD-10-CM

## 2023-12-14 DIAGNOSIS — K44.9 DIAPHRAGMATIC HERNIA WITHOUT OBSTRUCTION OR GANGRENE: ICD-10-CM

## 2023-12-14 DIAGNOSIS — R05.3 CHRONIC COUGH: ICD-10-CM

## 2023-12-14 PROCEDURE — 99214 OFFICE O/P EST MOD 30 MIN: CPT | Performed by: INTERNAL MEDICINE

## 2023-12-15 ENCOUNTER — OFFICE VISIT (OUTPATIENT)
Dept: ENDOCRINOLOGY | Facility: CLINIC | Age: 68
End: 2023-12-15
Payer: MEDICARE

## 2023-12-15 DIAGNOSIS — E11.65 TYPE 2 DIABETES MELLITUS WITH HYPERGLYCEMIA, WITHOUT LONG-TERM CURRENT USE OF INSULIN: Primary | ICD-10-CM

## 2023-12-15 NOTE — PROGRESS NOTES
Pt here today for 1 hour from 12PM to 1PM for Individual  DSMES.     CBW = 193#  Recommend wt loss of 5-10% = 10-20#    POC BG = 130 (2hrPP)       GOAL: increase PA to 20 minutes daily          Most Recent A1c  Hemoglobin A1C   Date Value Ref Range Status   12/01/2023 7.00 (H) 4.80 - 5.60 % Final            Current Medications     Current Outpatient Medications:     amLODIPine (NORVASC) 2.5 MG tablet, Take 1 tablet by mouth Daily., Disp: , Rfl:     Cholecalciferol (Vitamin D3) 50 MCG (2000 UT) capsule, Take 1 capsule by mouth Daily., Disp: , Rfl:     clonazePAM (KlonoPIN) 0.5 MG tablet, Take 1 tablet by mouth 2 (Two) Times a Day As Needed., Disp: , Rfl:     empagliflozin (Jardiance) 10 MG tablet tablet, Take 1 tablet by mouth Daily., Disp: 30 tablet, Rfl: 6    fluorouracil (EFUDEX) 5 % cream, Apply 1 application  topically to the appropriate area as directed Daily., Disp: , Rfl:     FREESTYLE LITE test strip, 1 each by Other route As Needed., Disp: , Rfl:     hydroCHLOROthiazide (HYDRODIURIL) 25 MG tablet, Take 1 tablet by mouth Daily., Disp: , Rfl:     lisinopril (PRINIVIL,ZESTRIL) 40 MG tablet, Take 1 tablet by mouth Daily., Disp: , Rfl:     multivitamin (THERAGRAN) tablet tablet, Take 1 tablet by mouth Daily., Disp: , Rfl:     Omega-3 Fatty Acids (fish oil) 1000 MG capsule capsule, Take 1 capsule by mouth Daily., Disp: , Rfl:     omeprazole (priLOSEC) 40 MG capsule, Take 1 capsule by mouth Daily., Disp: , Rfl:     psyllium (METAMUCIL) 0.52 g capsule, Take 1 capsule by mouth Daily., Disp: , Rfl:         Tamera Solitario RD, LD, [facility]   Nutrition and Diabetes Education     Diabetes Center   [facility]/[place]  [address] [place] [address]

## 2024-01-03 ENCOUNTER — TELEPHONE (OUTPATIENT)
Dept: ENDOCRINOLOGY | Facility: CLINIC | Age: 69
End: 2024-01-03
Payer: MEDICARE

## 2024-01-03 NOTE — TELEPHONE ENCOUNTER
Specialty Pharmacy      Sneha Vega is a 68 y.o. female seen by an Endocrinology provider for Type 2 Diabetes.    Discussed with patient  at her clinic visit to see if she might qualify for patient assistance program for her jardiance. Patient called back after checking her income documents and her household income is over the limit for both jardiance and farxiga.   I explained to the patient that the income limits could change for 2024 as it is based on the federal poverty limit.   Unfortunately at this time, we are unable to submit for the programs, but we can re-evaluate if things change in the future and she meets the program requirements.    Patient had no further questions or concerns at this time.    Eva Yeung, PharmD  Clinical Specialty Pharmacist, Endocrinology  1/3/2024  10:37 EST

## 2024-02-05 ENCOUNTER — OFFICE VISIT (OUTPATIENT)
Dept: ENDOCRINOLOGY | Facility: CLINIC | Age: 69
End: 2024-02-05
Payer: MEDICARE

## 2024-02-05 DIAGNOSIS — E11.65 TYPE 2 DIABETES MELLITUS WITH HYPERGLYCEMIA, WITHOUT LONG-TERM CURRENT USE OF INSULIN: Primary | ICD-10-CM

## 2024-02-05 PROCEDURE — G0109 DIAB MANAGE TRN IND/GROUP: HCPCS

## 2024-02-07 RX ORDER — BLOOD-GLUCOSE METER
1 KIT MISCELLANEOUS DAILY
Qty: 1 KIT | Refills: 0 | Status: SHIPPED | OUTPATIENT
Start: 2024-02-07

## 2024-02-07 NOTE — PROGRESS NOTES
Pt here today from 8:00 AM-12:00 PM (4 hours) for DSMES class 1. Pt states that she needs new meter. RD to send rx for Freestyle lite glucometer per pt request.

## 2024-02-15 ENCOUNTER — PATIENT ROUNDING (BHMG ONLY) (OUTPATIENT)
Dept: FAMILY MEDICINE CLINIC | Facility: CLINIC | Age: 69
End: 2024-02-15
Payer: MEDICARE

## 2024-02-15 ENCOUNTER — OFFICE VISIT (OUTPATIENT)
Dept: FAMILY MEDICINE CLINIC | Facility: CLINIC | Age: 69
End: 2024-02-15
Payer: MEDICARE

## 2024-02-15 VITALS
SYSTOLIC BLOOD PRESSURE: 120 MMHG | WEIGHT: 197.2 LBS | HEART RATE: 82 BPM | RESPIRATION RATE: 16 BRPM | DIASTOLIC BLOOD PRESSURE: 60 MMHG | BODY MASS INDEX: 30.95 KG/M2 | TEMPERATURE: 98.9 F | HEIGHT: 67 IN | OXYGEN SATURATION: 98 %

## 2024-02-15 DIAGNOSIS — Z09 ENCOUNTER FOR FOLLOW-UP EXAMINATION AFTER COMPLETED TREATMENT FOR CONDITIONS OTHER THAN MALIGNANT NEOPLASM: ICD-10-CM

## 2024-02-15 DIAGNOSIS — G89.29 CHRONIC BILATERAL LOW BACK PAIN WITHOUT SCIATICA: ICD-10-CM

## 2024-02-15 DIAGNOSIS — Z12.31 ENCOUNTER FOR SCREENING MAMMOGRAM FOR MALIGNANT NEOPLASM OF BREAST: ICD-10-CM

## 2024-02-15 DIAGNOSIS — G89.29 CHRONIC NECK PAIN: Primary | ICD-10-CM

## 2024-02-15 DIAGNOSIS — M54.50 CHRONIC BILATERAL LOW BACK PAIN WITHOUT SCIATICA: ICD-10-CM

## 2024-02-15 DIAGNOSIS — Z13.820 OSTEOPOROSIS SCREENING: ICD-10-CM

## 2024-02-15 DIAGNOSIS — M54.2 CHRONIC NECK PAIN: Primary | ICD-10-CM

## 2024-02-16 PROBLEM — R25.1 OCCASIONAL TREMORS: Status: ACTIVE | Noted: 2024-02-16

## 2024-02-16 PROBLEM — J38.5 LARYNGOSPASMS: Status: ACTIVE | Noted: 2024-02-16

## 2024-02-16 PROBLEM — M54.2 CHRONIC NECK PAIN: Status: ACTIVE | Noted: 2024-02-16

## 2024-02-16 PROBLEM — J38.3 VOCAL CORD DYSFUNCTION: Status: ACTIVE | Noted: 2024-02-16

## 2024-02-16 PROBLEM — M54.50 CHRONIC BILATERAL LOW BACK PAIN WITHOUT SCIATICA: Status: ACTIVE | Noted: 2024-02-16

## 2024-02-16 PROBLEM — F41.9 ANXIETY AND DEPRESSION: Status: ACTIVE | Noted: 2024-02-16

## 2024-02-16 PROBLEM — G89.29 CHRONIC NECK PAIN: Status: ACTIVE | Noted: 2024-02-16

## 2024-02-16 PROBLEM — F32.A ANXIETY AND DEPRESSION: Status: ACTIVE | Noted: 2024-02-16

## 2024-02-16 PROBLEM — R13.19 OTHER DYSPHAGIA: Status: ACTIVE | Noted: 2024-02-16

## 2024-02-16 PROBLEM — G89.29 CHRONIC BILATERAL LOW BACK PAIN WITHOUT SCIATICA: Status: ACTIVE | Noted: 2024-02-16

## 2024-03-05 ENCOUNTER — OFFICE VISIT (OUTPATIENT)
Dept: ENDOCRINOLOGY | Facility: CLINIC | Age: 69
End: 2024-03-05
Payer: MEDICARE

## 2024-03-05 VITALS
WEIGHT: 193 LBS | BODY MASS INDEX: 30.29 KG/M2 | HEIGHT: 67 IN | DIASTOLIC BLOOD PRESSURE: 70 MMHG | OXYGEN SATURATION: 98 % | HEART RATE: 72 BPM | SYSTOLIC BLOOD PRESSURE: 120 MMHG

## 2024-03-05 DIAGNOSIS — E11.65 TYPE 2 DIABETES MELLITUS WITH HYPERGLYCEMIA, WITHOUT LONG-TERM CURRENT USE OF INSULIN: Primary | ICD-10-CM

## 2024-03-05 DIAGNOSIS — E78.2 MIXED HYPERLIPIDEMIA: ICD-10-CM

## 2024-03-05 DIAGNOSIS — I10 ESSENTIAL HYPERTENSION: ICD-10-CM

## 2024-03-05 DIAGNOSIS — B35.1 ONYCHOMYCOSIS: ICD-10-CM

## 2024-03-05 LAB — GLUCOSE BLDC GLUCOMTR-MCNC: 171 MG/DL (ref 70–105)

## 2024-03-05 PROCEDURE — 1159F MED LIST DOCD IN RCRD: CPT | Performed by: INTERNAL MEDICINE

## 2024-03-05 PROCEDURE — 1160F RVW MEDS BY RX/DR IN RCRD: CPT | Performed by: INTERNAL MEDICINE

## 2024-03-05 PROCEDURE — 3074F SYST BP LT 130 MM HG: CPT | Performed by: INTERNAL MEDICINE

## 2024-03-05 PROCEDURE — 99214 OFFICE O/P EST MOD 30 MIN: CPT | Performed by: INTERNAL MEDICINE

## 2024-03-05 PROCEDURE — 82948 REAGENT STRIP/BLOOD GLUCOSE: CPT | Performed by: INTERNAL MEDICINE

## 2024-03-05 PROCEDURE — G2211 COMPLEX E/M VISIT ADD ON: HCPCS | Performed by: INTERNAL MEDICINE

## 2024-03-05 PROCEDURE — 3078F DIAST BP <80 MM HG: CPT | Performed by: INTERNAL MEDICINE

## 2024-03-05 NOTE — PROGRESS NOTES
Endocrine Progress Note Outpatient     Patient Care Team:  Jacqueline Bower DO as PCP - General (Family Medicine)  Fadi Zabala MD as Consulting Physician (Cardiology)  Prema Snow MD as Consulting Physician (Endocrinology)  Melanie Gibson MD as Consulting Physician (Otolaryngology)  Stew Campbell MD as Surgeon (General Surgery)    Chief Complaint: Uncontrolled diabetes    HPI: This is a 68-year-old female with history of type 2 diabetes, hypertension and hyperlipidemia is here for follow-up.    For type 2 diabetes: Initial diagnosis was in 2020.  She is currently on Jardiance 10 mg once a day.  So far she is tolerating her Jardiance well.  She did bring in blood sugar records for review and most of them are running between 130-160.  She is in process to get diabetes education.    Hypertension: Well-controlled    Hyperlipidemia: She is not taking any lipid-lowering agents at this time.    Past Medical History:   Diagnosis Date    Anxiety     Arthritis     Cancer     L mastectomy    Colitis     Dysphagia     Heart murmur     History of medical problems     Hyperlipidemia     Hypertension     Type 2 diabetes mellitus with hyperglycemia, without long-term current use of insulin 2023       Social History     Socioeconomic History    Marital status:     Number of children: 1    Years of education: 12    Highest education level: Bachelor's degree (e.g., BA, AB, BS)   Tobacco Use    Smoking status: Former     Current packs/day: 0.00     Types: Cigarettes     Start date: 1974     Quit date: 3/4/2001     Years since quittin.0     Passive exposure: Past    Smokeless tobacco: Never   Vaping Use    Vaping status: Never Used   Substance and Sexual Activity    Alcohol use: Not Currently    Drug use: Never    Sexual activity: Not Currently     Partners: Male       Family History   Problem Relation Age of Onset    Hypertension Mother     Heart failure Father     Stroke Father      Hyperlipidemia Father     Cancer Sister         skin    Heart murmur Sister     No Known Problems Brother     Diabetes Maternal Grandfather        Allergies   Allergen Reactions    Acyclovir Hives and Rash    Turmeric GI Intolerance       ROS:   Constitutional:  Denies fatigue, tiredness.    Eyes:  Denies change in visual acuity   HENT:  Denies nasal congestion or sore throat   Respiratory: denies cough, shortness of breath.   Cardiovascular:  denies chest pain, edema   GI:  Denies abdominal pain, nausea, vomiting.   Musculoskeletal:  Denies back pain or joint pain   Integument:  Denies dry skin and rash   Neurologic:  Denies headache, focal weakness or sensory changes   Endocrine:  Denies polyuria or polydipsia   Psychiatric:  Denies depression or anxiety      Vitals:    03/05/24 1111   BP: 120/70   Pulse: 72   SpO2: 98%     Body mass index is 30.23 kg/m².     Physical Exam:  GEN: NAD, conversant  EYES: EOMI, PERRL,  NECK: no thyromegaly,   CV: RRR  LUNG: CTA  SKIN: no rashes, no acanthosis  MSK: no deformities,   NEURO: no tremors, DTR normal  PSYCH: Awake and coherent      Results Review:     I reviewed the patient's new clinical results.    Lab Results   Component Value Date    HGBA1C 7.00 (H) 12/01/2023      Lab Results   Component Value Date    GLUCOSE 154 (H) 12/01/2023    BUN 20 12/01/2023    CREATININE 0.88 12/01/2023    EGFRIFAFRI >60 11/02/2022    BCR 22.7 12/01/2023    K 4.8 12/01/2023    CO2 28.0 12/01/2023    CALCIUM 9.8 12/01/2023    ALBUMIN 4.6 12/01/2023    AST 15 12/01/2023    ALT 15 12/01/2023    CHOL 227 (H) 12/01/2023    TRIG 122 12/01/2023     (H) 12/01/2023    HDL 57 12/01/2023       Medication Review: Reviewed.       Current Outpatient Medications:     amLODIPine (NORVASC) 2.5 MG tablet, Take 1 tablet by mouth Daily., Disp: , Rfl:     Blood Glucose Monitoring Suppl (FreeStyle Lite) w/Device kit, Use 1 Device Daily. Used to check blood glucose prn., Disp: 1 kit, Rfl: 0     Calcium-Magnesium-Vitamin D (CALCIUM MAGNESIUM PO), 0, Disp: , Rfl:     Cholecalciferol (Vitamin D3) 50 MCG (2000 UT) capsule, Take 1 capsule by mouth Daily., Disp: , Rfl:     empagliflozin (Jardiance) 10 MG tablet tablet, Take 1 tablet by mouth Daily., Disp: 30 tablet, Rfl: 6    fluorouracil (EFUDEX) 5 % cream, Apply 1 Application topically to the appropriate area as directed Daily., Disp: , Rfl:     FREESTYLE LITE test strip, 1 each by Other route As Needed., Disp: , Rfl:     hydroCHLOROthiazide (HYDRODIURIL) 25 MG tablet, Take 1 tablet by mouth Daily., Disp: , Rfl:     lisinopril (PRINIVIL,ZESTRIL) 40 MG tablet, Take 1 tablet by mouth Daily., Disp: , Rfl:     multivitamin (THERAGRAN) tablet tablet, Take 1 tablet by mouth Daily., Disp: , Rfl:     NON FORMULARY, RX Alternatives, [facility] Compound medication, Disp: , Rfl:     Omega-3 Fatty Acids (fish oil) 1000 MG capsule capsule, Take 1 capsule by mouth Daily., Disp: , Rfl:     omeprazole (priLOSEC) 40 MG capsule, Take 1 capsule by mouth Daily., Disp: , Rfl:     Probiotic Product (PROBIOTIC PO), 1 cap po qd, Disp: , Rfl:     Assessment and plan:  Diabetes mellitus type 2 with hyperglycemia: Overall doing well, last A1c was 7%.  Will continue Jardiance.  Recommend to continue to work on diet and activity and always keep glucose source in case of low blood sugars.    Hypertension: Well-controlled    Hyperlipidemia: Uncontrolled with high LDL, recommend statins.  She is not interested in taking any lipid-lowering medications at this time.  She will be working on her diet and activity.  She does understand that high LDL can increase the risk for heart disease and stroke.    Onychomycosis: Will refer to Dr. Braun.     Assessment and plan from November 27, 2023 reviewed and updated.           Prema Snow MD FACE.

## 2024-03-05 NOTE — PATIENT INSTRUCTIONS
Continue to work on your diet and activity  Always keep glucose source in case of low blood sugar  Labs fasting in the next few days  Arrange consultation with Dr. Braun for nail fungus.

## 2024-03-08 ENCOUNTER — LAB (OUTPATIENT)
Dept: LAB | Facility: HOSPITAL | Age: 69
End: 2024-03-08
Payer: MEDICARE

## 2024-03-08 DIAGNOSIS — E11.65 TYPE 2 DIABETES MELLITUS WITH HYPERGLYCEMIA, WITHOUT LONG-TERM CURRENT USE OF INSULIN: ICD-10-CM

## 2024-03-08 DIAGNOSIS — E11.65 TYPE 2 DIABETES MELLITUS WITH HYPERGLYCEMIA, WITHOUT LONG-TERM CURRENT USE OF INSULIN: Primary | ICD-10-CM

## 2024-03-08 LAB
ALBUMIN SERPL-MCNC: 4.5 G/DL (ref 3.5–5.2)
ALBUMIN UR-MCNC: <1.2 MG/DL
ALBUMIN/GLOB SERPL: 1.5 G/DL
ALP SERPL-CCNC: 74 U/L (ref 39–117)
ALT SERPL W P-5'-P-CCNC: 11 U/L (ref 1–33)
ANION GAP SERPL CALCULATED.3IONS-SCNC: 10.6 MMOL/L (ref 5–15)
AST SERPL-CCNC: 13 U/L (ref 1–32)
BILIRUB SERPL-MCNC: 0.3 MG/DL (ref 0–1.2)
BUN SERPL-MCNC: 26 MG/DL (ref 8–23)
BUN/CREAT SERPL: 26.3 (ref 7–25)
CALCIUM SPEC-SCNC: 9.9 MG/DL (ref 8.6–10.5)
CHLORIDE SERPL-SCNC: 98 MMOL/L (ref 98–107)
CHOLEST SERPL-MCNC: 208 MG/DL (ref 0–200)
CO2 SERPL-SCNC: 25.4 MMOL/L (ref 22–29)
CREAT SERPL-MCNC: 0.99 MG/DL (ref 0.57–1)
CREAT UR-MCNC: 46.8 MG/DL
EGFRCR SERPLBLD CKD-EPI 2021: 61.9 ML/MIN/1.73
GLOBULIN UR ELPH-MCNC: 3 GM/DL
GLUCOSE SERPL-MCNC: 178 MG/DL (ref 65–99)
HBA1C MFR BLD: 7.8 % (ref 4.8–5.6)
HDLC SERPL-MCNC: 58 MG/DL (ref 40–60)
LDLC SERPL CALC-MCNC: 135 MG/DL (ref 0–100)
LDLC/HDLC SERPL: 2.3 {RATIO}
MICROALBUMIN/CREAT UR: NORMAL MG/G{CREAT}
POTASSIUM SERPL-SCNC: 5.1 MMOL/L (ref 3.5–5.2)
PROT SERPL-MCNC: 7.5 G/DL (ref 6–8.5)
SODIUM SERPL-SCNC: 134 MMOL/L (ref 136–145)
TRIGL SERPL-MCNC: 82 MG/DL (ref 0–150)
VLDLC SERPL-MCNC: 15 MG/DL (ref 5–40)

## 2024-03-08 PROCEDURE — 82570 ASSAY OF URINE CREATININE: CPT

## 2024-03-08 PROCEDURE — 82043 UR ALBUMIN QUANTITATIVE: CPT

## 2024-03-08 PROCEDURE — 80061 LIPID PANEL: CPT

## 2024-03-08 PROCEDURE — 80053 COMPREHEN METABOLIC PANEL: CPT

## 2024-03-08 PROCEDURE — 36415 COLL VENOUS BLD VENIPUNCTURE: CPT

## 2024-03-08 PROCEDURE — 83036 HEMOGLOBIN GLYCOSYLATED A1C: CPT

## 2024-03-26 ENCOUNTER — HOSPITAL ENCOUNTER (OUTPATIENT)
Dept: GENERAL RADIOLOGY | Facility: HOSPITAL | Age: 69
Discharge: HOME | End: 2024-03-26
Payer: MEDICARE

## 2024-03-26 ENCOUNTER — HOSPITAL ENCOUNTER (OUTPATIENT)
Dept: BONE DENSITY | Facility: HOSPITAL | Age: 69
Discharge: HOME | End: 2024-03-26
Payer: MEDICARE

## 2024-03-26 ENCOUNTER — HOSPITAL ENCOUNTER (OUTPATIENT)
Dept: MAMMOGRAPHY | Facility: HOSPITAL | Age: 69
Discharge: HOME | End: 2024-03-26
Payer: MEDICARE

## 2024-03-26 DIAGNOSIS — Z12.31 ENCOUNTER FOR SCREENING MAMMOGRAM FOR MALIGNANT NEOPLASM OF BREAST: ICD-10-CM

## 2024-03-26 DIAGNOSIS — Z13.820 OSTEOPOROSIS SCREENING: ICD-10-CM

## 2024-03-26 DIAGNOSIS — Z09 ENCOUNTER FOR FOLLOW-UP EXAMINATION AFTER COMPLETED TREATMENT FOR CONDITIONS OTHER THAN MALIGNANT NEOPLASM: ICD-10-CM

## 2024-03-26 PROCEDURE — 72100 X-RAY EXAM L-S SPINE 2/3 VWS: CPT

## 2024-03-26 PROCEDURE — 77067 SCR MAMMO BI INCL CAD: CPT

## 2024-03-26 PROCEDURE — 77063 BREAST TOMOSYNTHESIS BI: CPT

## 2024-03-26 PROCEDURE — 72040 X-RAY EXAM NECK SPINE 2-3 VW: CPT

## 2024-03-26 PROCEDURE — 77080 DXA BONE DENSITY AXIAL: CPT

## 2024-03-28 PROBLEM — M85.89 OSTEOPENIA OF MULTIPLE SITES: Status: ACTIVE | Noted: 2024-03-28

## 2024-04-04 ENCOUNTER — OFFICE VISIT (OUTPATIENT)
Dept: FAMILY MEDICINE CLINIC | Facility: CLINIC | Age: 69
End: 2024-04-04
Payer: MEDICARE

## 2024-04-04 VITALS
WEIGHT: 194.4 LBS | HEIGHT: 67 IN | DIASTOLIC BLOOD PRESSURE: 70 MMHG | TEMPERATURE: 97.6 F | HEART RATE: 78 BPM | RESPIRATION RATE: 16 BRPM | OXYGEN SATURATION: 97 % | BODY MASS INDEX: 30.51 KG/M2 | SYSTOLIC BLOOD PRESSURE: 130 MMHG

## 2024-04-04 DIAGNOSIS — E66.09 CLASS 1 OBESITY DUE TO EXCESS CALORIES WITH SERIOUS COMORBIDITY AND BODY MASS INDEX (BMI) OF 30.0 TO 30.9 IN ADULT: ICD-10-CM

## 2024-04-04 DIAGNOSIS — Z13.29 THYROID DISORDER SCREEN: ICD-10-CM

## 2024-04-04 DIAGNOSIS — Z11.59 ENCOUNTER FOR HEPATITIS C SCREENING TEST FOR LOW RISK PATIENT: ICD-10-CM

## 2024-04-04 DIAGNOSIS — E55.9 VITAMIN D DEFICIENCY: ICD-10-CM

## 2024-04-04 DIAGNOSIS — Z00.00 MEDICARE ANNUAL WELLNESS VISIT, SUBSEQUENT: Primary | ICD-10-CM

## 2024-04-04 PROBLEM — K52.832 LYMPHOCYTIC COLITIS: Status: ACTIVE | Noted: 2024-04-04

## 2024-04-04 PROCEDURE — 1170F FXNL STATUS ASSESSED: CPT | Performed by: STUDENT IN AN ORGANIZED HEALTH CARE EDUCATION/TRAINING PROGRAM

## 2024-04-04 PROCEDURE — 3075F SYST BP GE 130 - 139MM HG: CPT | Performed by: STUDENT IN AN ORGANIZED HEALTH CARE EDUCATION/TRAINING PROGRAM

## 2024-04-04 PROCEDURE — G0439 PPPS, SUBSEQ VISIT: HCPCS | Performed by: STUDENT IN AN ORGANIZED HEALTH CARE EDUCATION/TRAINING PROGRAM

## 2024-04-04 PROCEDURE — 3051F HG A1C>EQUAL 7.0%<8.0%: CPT | Performed by: STUDENT IN AN ORGANIZED HEALTH CARE EDUCATION/TRAINING PROGRAM

## 2024-04-04 PROCEDURE — 3078F DIAST BP <80 MM HG: CPT | Performed by: STUDENT IN AN ORGANIZED HEALTH CARE EDUCATION/TRAINING PROGRAM

## 2024-04-04 NOTE — PROGRESS NOTES
The ABCs of the Annual Wellness Visit  Subsequent Medicare Wellness Visit    Subjective      Sneha Vega is a 69 y.o. female who presents for a Subsequent Medicare Wellness Visit.    The following portions of the patient's history were reviewed and   updated as appropriate: allergies, current medications, past family history, past medical history, past social history, past surgical history, and problem list.    Compared to one year ago, the patient feels her physical   health is better.    Compared to one year ago, the patient feels her mental   health is the same.        Recent Hospitalizations:  She was not admitted to the hospital during the last year.       Current Medical Providers:  Patient Care Team:  Jacqueline Bower DO as PCP - General (Family Medicine)  Fadi Zabala MD as Consulting Physician (Cardiology)  Prema Snow MD as Consulting Physician (Endocrinology)  Melanie Gibson MD as Consulting Physician (Otolaryngology)  Stew Campbell MD as Surgeon (General Surgery)  Blake Martinez as Consulting Physician (Gastroenterology)  Jimbo Roman II, MD as Consulting Physician (Orthopedic Surgery)  Idalia Baxter MD (Dermatology)  [facility] (Optometry)  Dr. Eric Garcia (Optometry)    Outpatient Medications Prior to Visit   Medication Sig Dispense Refill    amLODIPine (NORVASC) 2.5 MG tablet Take 1 tablet by mouth Daily.      Blood Glucose Monitoring Suppl (FreeStyle Lite) w/Device kit Use 1 Device Daily. Used to check blood glucose prn. 1 kit 0    Calcium-Magnesium-Vitamin D (CALCIUM MAGNESIUM PO) 0      Cholecalciferol (Vitamin D3) 50 MCG (2000 UT) capsule Take 1 capsule by mouth Daily.      empagliflozin (Jardiance) 10 MG tablet tablet Take 1 tablet by mouth Daily. 30 tablet 6    fluorouracil (EFUDEX) 5 % cream Apply 1 Application topically to the appropriate area as directed Daily.      FREESTYLE LITE test strip 1 each by Other route As Needed.       "hydroCHLOROthiazide (HYDRODIURIL) 25 MG tablet Take 1 tablet by mouth Daily.      lisinopril (PRINIVIL,ZESTRIL) 40 MG tablet Take 1 tablet by mouth Daily.      multivitamin (THERAGRAN) tablet tablet Take 1 tablet by mouth Daily.      NON FORMULARY RX Alternatives, [facility]  Compound medication      Omega-3 Fatty Acids (fish oil) 1000 MG capsule capsule Take 1 capsule by mouth Daily.      omeprazole (priLOSEC) 40 MG capsule Take 1 capsule by mouth Daily.      Probiotic Product (PROBIOTIC PO) 1 cap po qd       No facility-administered medications prior to visit.       No opioid medication identified on active medication list. I have reviewed chart for other potential  high risk medication/s and harmful drug interactions in the elderly.        Aspirin is not on active medication list.  Aspirin use is not indicated based on review of current medical condition/s. Risk of harm outweighs potential benefits.  .    Patient Active Problem List   Diagnosis    Type 2 diabetes mellitus with hyperglycemia, without long-term current use of insulin    Mixed hyperlipidemia    Essential hypertension    Chronic neck pain    Chronic bilateral low back pain without sciatica    Other dysphagia    Anxiety and depression    Laryngospasms    Vocal cord dysfunction    Occasional tremors    Onychomycosis    Osteopenia of multiple sites    Lymphocytic colitis     Advance Care Planning   Advance Care Planning     Advance Directive is not on file.  Patient states that she has a power of  (her son) and she will try to get us copies for her chart     Objective    Vitals:    04/04/24 0906   BP: 130/70   BP Location: Right arm   Patient Position: Sitting   Cuff Size: Adult   Pulse: 78   Resp: 16   Temp: 97.6 °F (36.4 °C)   TempSrc: Skin   SpO2: 97%   Weight: 88.2 kg (194 lb 6.4 oz)   Height: 170.2 cm (67\")     Estimated body mass index is 30.45 kg/m² as calculated from the following:    Height as of this encounter: 170.2 cm (67\").    " Weight as of this encounter: 88.2 kg (194 lb 6.4 oz).    BMI is >= 30 and <35. (Class 1 Obesity). The following options were offered after discussion;: weight loss educational material (shared in after visit summary), exercise counseling/recommendations, nutrition counseling/recommendations, and information on healthy weight added to patient's after visit summary       Does the patient have evidence of cognitive impairment?   No     - ACE III minico/30       Lab Results   Component Value Date    TRIG 82 2024    HDL 58 2024     (H) 2024    VLDL 15 2024    HGBA1C 7.80 (H) 2024          HEALTH RISK ASSESSMENT    Smoking Status:  Social History     Tobacco Use   Smoking Status Former    Current packs/day: 0.00    Average packs/day: 1 pack/day for 20.0 years (20.0 ttl pk-yrs)    Types: Cigarettes    Start date: 1974    Quit date: 3/4/2001    Years since quittin.1    Passive exposure: Past   Smokeless Tobacco Never     Alcohol Consumption:  Social History     Substance and Sexual Activity   Alcohol Use Not Currently     Fall Risk Screen:    STEADI Fall Risk Assessment was completed, and patient is at LOW risk for falls.Assessment completed on:2024    Depression Screenin/4/2024     9:00 AM   PHQ-2/PHQ-9 Depression Screening   Little Interest or Pleasure in Doing Things 0-->not at all   Feeling Down, Depressed or Hopeless 0-->not at all   Trouble Falling or Staying Asleep, or Sleeping Too Much 0-->not at all   Feeling Tired or Having Little Energy 0-->not at all   Poor Appetite or Overeating 0-->not at all   Feeling Bad about Yourself - or that You are a Failure or Have Let Yourself or Your Family Down 0-->not at all   Trouble Concentrating on Things, Such as Reading the Newspaper or Watching Television 0-->not at all   Moving or Speaking So Slowly that Other People Could Have Noticed? Or the Opposite - Being So Fidgety 0-->not at all   PHQ-9: Brief Depression  Severity Measure Score 0   If You Checked Off Any Problems, How Difficult Have These Problems Made It For You to Do Your Work, Take Care of Things at Home, or Get Along with Other People? not difficult at all       Health Habits and Functional and Cognitive Screenin/4/2024     9:00 AM   Functional & Cognitive Status   Do you have difficulty preparing food and eating? No   Do you have difficulty bathing yourself, getting dressed or grooming yourself? No   Do you have difficulty using the toilet? No   Do you have difficulty moving around from place to place? No   Do you have trouble with steps or getting out of a bed or a chair? No   Current Diet Well Balanced Diet   Dental Exam Up to date   Eye Exam Up to date   Exercise (times per week) 1 times per week        Exercise Frequency Comment to 4 x per week   Current Exercises Include Gardening;House Cleaning;Walking   Do you need help using the phone?  No   Are you deaf or do you have serious difficulty hearing?  Yes   Do you need help to go to places out of walking distance? No   Do you need help shopping? No   Do you need help preparing meals?  No   Do you need help with housework?  No   Do you need help with laundry? No   Do you need help taking your medications? No   Do you need help managing money? No   Do you ever drive or ride in a car without wearing a seat belt? No   Have you felt unusual stress, anger or loneliness in the last month? No   Who do you live with? Other   If you need help, do you have trouble finding someone available to you? No   Have you been bothered in the last four weeks by sexual problems? No   Do you have difficulty concentrating, remembering or making decisions? No       Age-appropriate Screening Schedule:  Refer to the list below for future screening recommendations based on patient's age, sex and/or medical conditions. Orders for these recommended tests are listed in the plan section. The patient has been provided with a written  plan.    Health Maintenance   Topic Date Due    COVID-19 Vaccine (6 - 2023-24 season) 09/01/2023    HEPATITIS C SCREENING  Never done    RSV Vaccine - Adults (1 - 1-dose 60+ series) 02/15/2025 (Originally 3/8/2015)    TDAP/TD VACCINES (1 - Tdap) 02/15/2025 (Originally 3/8/1974)    INFLUENZA VACCINE  08/01/2024    HEMOGLOBIN A1C  09/08/2024    DIABETIC EYE EXAM  12/05/2024    LIPID PANEL  03/08/2025    URINE MICROALBUMIN  03/08/2025    ANNUAL WELLNESS VISIT  04/04/2025    DIABETIC FOOT EXAM  04/04/2025    BMI FOLLOWUP  04/04/2025    MAMMOGRAM  03/26/2026    DXA SCAN  03/26/2026    COLORECTAL CANCER SCREENING  06/09/2033    Pneumococcal Vaccine 65+  Completed    ZOSTER VACCINE  Completed                Diabetic foot exam:   Left: Filament test present   Pulses Dorsalis Pedis:  present  Posterior Tibial:  present   Sharp/dull discrimination normal       Right: Filament test present   Pulses Dorsalis Pedis:  present  Posterior Tibial:  present   Sharp/dull discrimination normal      CMS Preventative Services Quick Reference  Risk Factors Identified During Encounter:    None Identified    The above risks/problems have been discussed with the patient.  Pertinent information has been shared with the patient in the After Visit Summary.    Diagnoses and all orders for this visit:    1. Medicare annual wellness visit, subsequent (Primary)  -Patient has a power of  and will get us copies  -Pertinent screening labs ordered per below.  Patient's endocrinologist already ordered A1c, microalbumin/creatinine ratio, CMP, lipid panel  -Endocrinologist recommended statin.  Patient states that she does not feel that it is bad enough to treat with medication right now.  Also echoed endocrinologist today stating that being a diabetic is an automatic indication to be on a statin because diabetes increases risk for heart attack and stroke.  Patient verbalized understanding but still politely declined      2. Thyroid disorder  screen  -     TSH Rfx On Abnormal To Free T4    3. Vitamin D deficiency  -     Vitamin D,25-Hydroxy    4. Encounter for hepatitis C screening test for low risk patient  -     HCV Antibody Rfx To Qnt PCR    5. Class 1 obesity due to excess calories with serious comorbidity and body mass index (BMI) of 30.0 to 30.9 in adult  -Information about nutrition and standardized exercise recommendations added to patient's after visit summary        Follow Up:   Next Medicare Wellness visit to be scheduled in 1 year.      An After Visit Summary and PPPS were made available to the patient.

## 2024-04-15 ENCOUNTER — OFFICE VISIT (OUTPATIENT)
Dept: ENDOCRINOLOGY | Facility: CLINIC | Age: 69
End: 2024-04-15
Payer: MEDICARE

## 2024-04-15 ENCOUNTER — OFFICE (OUTPATIENT)
Dept: URBAN - METROPOLITAN AREA CLINIC 64 | Facility: CLINIC | Age: 69
End: 2024-04-15

## 2024-04-15 VITALS
HEART RATE: 78 BPM | SYSTOLIC BLOOD PRESSURE: 100 MMHG | WEIGHT: 193 LBS | HEIGHT: 66 IN | DIASTOLIC BLOOD PRESSURE: 67 MMHG

## 2024-04-15 DIAGNOSIS — K44.9 DIAPHRAGMATIC HERNIA WITHOUT OBSTRUCTION OR GANGRENE: ICD-10-CM

## 2024-04-15 DIAGNOSIS — R05.3 CHRONIC COUGH: ICD-10-CM

## 2024-04-15 DIAGNOSIS — J38.5 LARYNGEAL SPASM: ICD-10-CM

## 2024-04-15 DIAGNOSIS — E11.65 TYPE 2 DIABETES MELLITUS WITH HYPERGLYCEMIA, WITHOUT LONG-TERM CURRENT USE OF INSULIN: Primary | ICD-10-CM

## 2024-04-15 PROCEDURE — G0109 DIAB MANAGE TRN IND/GROUP: HCPCS

## 2024-04-15 PROCEDURE — 99213 OFFICE O/P EST LOW 20 MIN: CPT | Performed by: INTERNAL MEDICINE

## 2024-04-15 PROCEDURE — 3051F HG A1C>EQUAL 7.0%<8.0%: CPT

## 2024-04-16 ENCOUNTER — TELEPHONE (OUTPATIENT)
Dept: ENDOCRINOLOGY | Facility: CLINIC | Age: 69
End: 2024-04-16
Payer: MEDICARE

## 2024-04-16 NOTE — PROGRESS NOTES
Patient attended class 2 from 8AM To 12PM For a total of 4 Hours.    Discussed one plate method, carb counting, portion sizes and food label reading.  Patient given an individualized meal plan.  Exercise and movement importance stressed and encouraged.  Discussed stress management and community resources available.  Evaluate goals set.  Encouraged patient to return in 13 months for refresher course.   BLE 0/5 BUE grossly 2/5

## 2024-04-22 ENCOUNTER — TELEPHONE (OUTPATIENT)
Dept: FAMILY MEDICINE CLINIC | Facility: CLINIC | Age: 69
End: 2024-04-22
Payer: MEDICARE

## 2024-04-22 ENCOUNTER — OFFICE VISIT (OUTPATIENT)
Dept: CARDIOLOGY | Facility: CLINIC | Age: 69
End: 2024-04-22
Payer: MEDICARE

## 2024-04-22 VITALS
HEART RATE: 72 BPM | OXYGEN SATURATION: 97 % | HEIGHT: 62 IN | BODY MASS INDEX: 35.88 KG/M2 | SYSTOLIC BLOOD PRESSURE: 122 MMHG | WEIGHT: 195 LBS | DIASTOLIC BLOOD PRESSURE: 80 MMHG

## 2024-04-22 DIAGNOSIS — R00.2 PALPITATIONS: ICD-10-CM

## 2024-04-22 DIAGNOSIS — I10 PRIMARY HYPERTENSION: Primary | ICD-10-CM

## 2024-04-22 DIAGNOSIS — E78.00 PURE HYPERCHOLESTEROLEMIA: ICD-10-CM

## 2024-04-22 DIAGNOSIS — E11.9 TYPE 2 DIABETES MELLITUS WITHOUT COMPLICATION, WITHOUT LONG-TERM CURRENT USE OF INSULIN: ICD-10-CM

## 2024-04-22 PROCEDURE — 99213 OFFICE O/P EST LOW 20 MIN: CPT | Performed by: INTERNAL MEDICINE

## 2024-04-22 PROCEDURE — 93000 ELECTROCARDIOGRAM COMPLETE: CPT | Performed by: INTERNAL MEDICINE

## 2024-04-22 PROCEDURE — 1160F RVW MEDS BY RX/DR IN RCRD: CPT | Performed by: INTERNAL MEDICINE

## 2024-04-22 PROCEDURE — 1159F MED LIST DOCD IN RCRD: CPT | Performed by: INTERNAL MEDICINE

## 2024-04-22 PROCEDURE — 3079F DIAST BP 80-89 MM HG: CPT | Performed by: INTERNAL MEDICINE

## 2024-04-22 PROCEDURE — 3074F SYST BP LT 130 MM HG: CPT | Performed by: INTERNAL MEDICINE

## 2024-04-22 NOTE — TELEPHONE ENCOUNTER
Caller: Sneha Vega    Relationship: Self    Best call back number: [phone]     What orders are you requesting (i.e. lab or imaging): LABS    In what timeframe would the patient need to come in: ASAP    Where will you receive your lab/imaging services: IN HOUSE    Additional notes: PATIENT CALLED AND SAID CARDIOLOGIST IS WORRIED THAT A COUPLE OF MEDICATIONS SHE WAS/IS ON COULD BE CAUSING KIDNEY DAMAGE.  SHE IS ASKING FOR LAB WORK TO CHECK THIS.  THE SOONEST APPOINTMENT SHE COULD GET WAS MAY 13 AND SHE S REQUESTING LAB WORK BE SCHEDULED BEFORE THAT APPOINTMENT.  PLEASE CALL TO ADVISE.

## 2024-04-22 NOTE — PROGRESS NOTES
"    Subjective:     Encounter Date:04/22/2024      Patient ID: Sneha Vega is a 69 y.o. female.    Chief Complaint:  History of Present Illness 69-year-old white female with history of diabetes hypertension hyperlipidemia presents to my office for a follow-up.  Patient was having symptoms of palpitations and has had a Holter monitor which showed normal rhythm only.  She still continues to have occasional dizziness but no other symptoms of chest pain or she has occasional shortness of breath.  No comes any PND orthopnea.  No palpitations syncope or swelling of the feet.  She is taking her medicines regularly.  She does not smoke.    The following portions of the patient's history were reviewed and updated as appropriate: allergies, current medications, past family history, past medical history, past social history, past surgical history, and problem list.  Past Medical History:   Diagnosis Date    Anxiety     Arthritis     Cancer 1999    L mastectomy    Colitis     Dysphagia     Heart murmur     History of medical problems     Hyperlipidemia     Hypertension     Type 2 diabetes mellitus with hyperglycemia, without long-term current use of insulin 11/27/2023     Past Surgical History:   Procedure Laterality Date    BREAST SURGERY  1999    reconstruction    COLONOSCOPY      MASTECTOMY Left 1999     /80   Pulse 72   Ht 157.5 cm (62\")   Wt 88.5 kg (195 lb)   SpO2 97%   BMI 35.67 kg/m²   Family History   Problem Relation Age of Onset    Hypertension Mother     Heart failure Father     Stroke Father     Hyperlipidemia Father     Cancer Sister         skin    Heart murmur Sister     No Known Problems Brother     Diabetes Maternal Grandfather        Current Outpatient Medications:     amLODIPine (NORVASC) 2.5 MG tablet, Take 1 tablet by mouth Daily., Disp: , Rfl:     Blood Glucose Monitoring Suppl (FreeStyle Lite) w/Device kit, Use 1 Device Daily. Used to check blood glucose prn., Disp: 1 kit, Rfl: 0    " Calcium-Magnesium-Vitamin D (CALCIUM MAGNESIUM PO), 0, Disp: , Rfl:     Cholecalciferol (Vitamin D3) 50 MCG (2000 UT) capsule, Take 1 capsule by mouth Daily., Disp: , Rfl:     empagliflozin (Jardiance) 10 MG tablet tablet, Take 1 tablet by mouth Daily., Disp: 30 tablet, Rfl: 6    fluorouracil (EFUDEX) 5 % cream, Apply 1 Application topically to the appropriate area as directed Daily., Disp: , Rfl:     FREESTYLE LITE test strip, 1 each by Other route As Needed., Disp: , Rfl:     hydroCHLOROthiazide (HYDRODIURIL) 25 MG tablet, Take 1 tablet by mouth Daily., Disp: , Rfl:     lisinopril (PRINIVIL,ZESTRIL) 40 MG tablet, Take 1 tablet by mouth Daily., Disp: , Rfl:     multivitamin (THERAGRAN) tablet tablet, Take 1 tablet by mouth Daily., Disp: , Rfl:     Omega-3 Fatty Acids (fish oil) 1000 MG capsule capsule, Take 1 capsule by mouth Daily., Disp: , Rfl:     omeprazole (priLOSEC) 40 MG capsule, Take 1 capsule by mouth Daily., Disp: , Rfl:     Probiotic Product (PROBIOTIC PO), 1 cap po qd, Disp: , Rfl:   Allergies   Allergen Reactions    Acyclovir Hives and Rash    Turmeric GI Intolerance     Social History     Socioeconomic History    Marital status:     Number of children: 1    Years of education: 12    Highest education level: Bachelor's degree (e.g., BA, AB, BS)   Tobacco Use    Smoking status: Former     Current packs/day: 0.00     Average packs/day: 1 pack/day for 20.0 years (20.0 ttl pk-yrs)     Types: Cigarettes     Start date: 1974     Quit date: 3/4/2001     Years since quittin.1     Passive exposure: Past    Smokeless tobacco: Never   Vaping Use    Vaping status: Never Used   Substance and Sexual Activity    Alcohol use: Not Currently    Drug use: Never    Sexual activity: Not Currently     Partners: Male     Review of Systems   Constitutional: Positive for malaise/fatigue.   Cardiovascular:  Negative for chest pain, dyspnea on exertion, leg swelling and palpitations.   Respiratory:  Positive for  shortness of breath. Negative for cough.    Gastrointestinal:  Negative for abdominal pain, nausea and vomiting.   Neurological:  Positive for dizziness and light-headedness. Negative for focal weakness, headaches and numbness.   All other systems reviewed and are negative.             Objective:     Constitutional:       Appearance: Well-developed.   Eyes:      General: No scleral icterus.     Conjunctiva/sclera: Conjunctivae normal.   HENT:      Head: Normocephalic and atraumatic.   Neck:      Vascular: No carotid bruit or JVD.   Pulmonary:      Effort: Pulmonary effort is normal.      Breath sounds: Normal breath sounds. No wheezing. No rales.   Cardiovascular:      Normal rate. Regular rhythm.   Pulses:     Intact distal pulses.   Abdominal:      General: Bowel sounds are normal.      Palpations: Abdomen is soft.   Musculoskeletal:      Cervical back: Normal range of motion and neck supple. Skin:     General: Skin is warm and dry.      Findings: No rash.   Neurological:      Mental Status: Alert.         ECG 12 Lead    Date/Time: 4/22/2024 3:01 PM  Performed by: Fadi Zabala MD    Authorized by: Fadi Zabala MD  Comments: Sinus rhythm  Normal ECG  No new changes from previous ECG          Lab Review:         MDM  #1 palpitation  Patient had a Holter monitor which showed normal sinus rhythm and she is not having any more symptoms  2.  Hypertension  Patient blood pressure currently stable on lisinopril and amlodipine  3.  Hyperlipidemia  Patient is on over-the-counter medicines with fish oil and taking tests regularly  4.  Diabetes  Patient is on oral medicines and followed by the primary care doctor.      Patient's previous medical records, labs, and EKG were reviewed and discussed with the patient at today's visit.

## 2024-04-23 NOTE — TELEPHONE ENCOUNTER
Spoke to pt 04/23/2024, 5:57 pm pt states she was seen by Dr. Zabala 04/22/2024 and was discharged by him stating Dr. Bower can take over the hypertension.    He told her to stop HCTZ immediately due to taking Jardiance.  He states taking both will damage her kidneys.    She has stopped taking the HCTZ and is monitoring her blood pressure daily and documenting.    She states if her blood pressure goes up before her appt she will call the office.    Advised her to bring documentation of BP readings at her next visit.

## 2024-04-23 NOTE — TELEPHONE ENCOUNTER
It looks like Dr. Alanis had run a CMP (which checks kidney function, liver function and electrolytes) on 3/8/2024.  Her creatinine levels and GFR (both reflections of kidney function) were in good range.  Is there a new medication that she is taking since then that she is concerned about?

## 2024-04-29 ENCOUNTER — TELEPHONE (OUTPATIENT)
Dept: ENDOCRINOLOGY | Facility: CLINIC | Age: 69
End: 2024-04-29

## 2024-04-29 NOTE — TELEPHONE ENCOUNTER
She is having surgery hital hernia with reflex repair this wed. Stopped jardiance Sunday. Ill resume after surgery. Will be on a liquid and soft diet for 2 weeks. Just wondering if there is anything she should take caution. Please advise.

## 2024-05-03 ENCOUNTER — READMISSION MANAGEMENT (OUTPATIENT)
Dept: CALL CENTER | Facility: HOSPITAL | Age: 69
End: 2024-05-03
Payer: MEDICARE

## 2024-05-04 NOTE — OUTREACH NOTE
Prep Survey      Flowsheet Row Responses   [facility]ondenominational facility patient discharged from? Non-BH   Is LACE score < 7 ? Non-BH Discharge   Eligibility [facility]   Date of Admission 05/01/24   Date of Discharge 05/03/24   Discharge Disposition Home or Self Care   Discharge diagnosis Paraesophageal herniaLAPAROSCOPIC HIATAL HERNIORRHAPHY   Does the patient have one of the following disease processes/diagnoses(primary or secondary)? General Surgery   Does the patient have Home health ordered? No   Is there a DME ordered? No   Prep survey completed? Yes            MARTINE TIM - Registered Nurse

## 2024-05-06 ENCOUNTER — TRANSITIONAL CARE MANAGEMENT TELEPHONE ENCOUNTER (OUTPATIENT)
Dept: CALL CENTER | Facility: HOSPITAL | Age: 69
End: 2024-05-06
Payer: MEDICARE

## 2024-05-09 ENCOUNTER — TELEPHONE (OUTPATIENT)
Dept: ENDOCRINOLOGY | Facility: CLINIC | Age: 69
End: 2024-05-09
Payer: MEDICARE

## 2024-05-13 ENCOUNTER — OFFICE VISIT (OUTPATIENT)
Dept: FAMILY MEDICINE CLINIC | Facility: CLINIC | Age: 69
End: 2024-05-13
Payer: MEDICARE

## 2024-05-13 VITALS
HEIGHT: 62 IN | OXYGEN SATURATION: 96 % | HEART RATE: 70 BPM | RESPIRATION RATE: 16 BRPM | WEIGHT: 184 LBS | SYSTOLIC BLOOD PRESSURE: 118 MMHG | DIASTOLIC BLOOD PRESSURE: 64 MMHG | BODY MASS INDEX: 33.86 KG/M2 | TEMPERATURE: 98.9 F

## 2024-05-13 DIAGNOSIS — E66.09 CLASS 1 OBESITY DUE TO EXCESS CALORIES WITH SERIOUS COMORBIDITY AND BODY MASS INDEX (BMI) OF 33.0 TO 33.9 IN ADULT: ICD-10-CM

## 2024-05-13 DIAGNOSIS — I10 ESSENTIAL HYPERTENSION: ICD-10-CM

## 2024-05-13 DIAGNOSIS — Z09 HOSPITAL DISCHARGE FOLLOW-UP: Primary | ICD-10-CM

## 2024-05-13 DIAGNOSIS — R09.89 VEIN SYMPTOM: ICD-10-CM

## 2024-05-13 DIAGNOSIS — Z98.890 H/O HERNIA REPAIR: ICD-10-CM

## 2024-05-13 DIAGNOSIS — Z87.19 H/O HERNIA REPAIR: ICD-10-CM

## 2024-05-13 DIAGNOSIS — R06.02 SHORTNESS OF BREATH: ICD-10-CM

## 2024-05-13 PROCEDURE — 3078F DIAST BP <80 MM HG: CPT | Performed by: STUDENT IN AN ORGANIZED HEALTH CARE EDUCATION/TRAINING PROGRAM

## 2024-05-13 PROCEDURE — 99214 OFFICE O/P EST MOD 30 MIN: CPT | Performed by: STUDENT IN AN ORGANIZED HEALTH CARE EDUCATION/TRAINING PROGRAM

## 2024-05-13 PROCEDURE — 3074F SYST BP LT 130 MM HG: CPT | Performed by: STUDENT IN AN ORGANIZED HEALTH CARE EDUCATION/TRAINING PROGRAM

## 2024-05-13 PROCEDURE — 1111F DSCHRG MED/CURRENT MED MERGE: CPT | Performed by: STUDENT IN AN ORGANIZED HEALTH CARE EDUCATION/TRAINING PROGRAM

## 2024-05-13 PROCEDURE — 3051F HG A1C>EQUAL 7.0%<8.0%: CPT | Performed by: STUDENT IN AN ORGANIZED HEALTH CARE EDUCATION/TRAINING PROGRAM

## 2024-05-13 RX ORDER — ONDANSETRON 4 MG/1
4 TABLET, FILM COATED ORAL
COMMUNITY
Start: 2024-05-03 | End: 2024-05-17

## 2024-05-13 NOTE — PROGRESS NOTES
Subjective   Sneha Vega is a 69 y.o. female.   Chief Complaint   Patient presents with    Hospital Follow Up Visit     [facility] 05/01/2024-05/03/2024    Hypertension       History of Present Illness       Hospital follow up:  Sneha was seen at [facility] .   She was admitted on 05/01/2024  for Paraesophageal hernia.   She was discharged on 05/03/2024.     Course of Events  -Patient did a laparoscopic paraesophageal hernia repair on 5/1/2024 with Dr Stew Campbell  -Patient tolerated procedure well.  It is noted that diet was advanced slowly as bowel function returned.  She was discharged on a p.o. pain regimen    Today  -Patient states she is currently on a puréed diet  -She states she was told she would not eat normally until 6 weeks  -She followed up with surgery last Thursday  -Due to her surgery, she states she is now off omeprazole for GERD      Shortness of breath  -Patient was feeling very short of breath prior to her surgery and she was told it was because of her hiatal hernia  -After her surgery, she still feels a little short of breath but wants to give her self time to recover  -She states she was told that she had some calcifications in her lungs when she had an x-ray done over at [facility]  -She states that if her shortness of breath does not resolve in the coming weeks she would be interested in having more detailed imaging to investigate    Blown veins  -2 occurred in her right arm while she was hospitalized  -She had 1 occur over the dorsum of her right wrist and one of the veins going over the dorsum of her hand seems a little enlarged  -These areas are little sore and she would like to have them looked at    Hypertension  -Current medications:   Amlodipine 2.5 mg daily, Lisinopril 40 mg daily   -Pt discontinued HCTZ due to elevated CMP (kidney function)  -Patient does check blood pressure at home.   -Patient denies chest pain, blurred vision,fatigue, palpitations, shortness of breath or  headaches.  -Patient states medications is working well.   -Last two blood pressures:  130/70             04/04/2024  122/80  04/22/2024  -Blood pressure today:   118/64  - home bp measures have been 120/76 on average  - feels much better off HCTZ  -Patient states that her blood pressure went up after she got COVID.  Cardiology has turned patient back over to PCP for further monitoring/management of hypertension as patient's blood pressure has evened out    Preventative  -Patient asked about Pap smear.  She states that her last Pap smear was done at age 67 at [facility].  Explained to patient that after 65 that we stop screening for cervical cancer.  Patient signed transfer of information form to get her last Pap smear results in our chart      The following portions of the patient's history were reviewed and updated as appropriate: allergies, current medications, past family history, past medical history, past social history, past surgical history, and problem list.    Patient Active Problem List   Diagnosis    Type 2 diabetes mellitus with hyperglycemia, without long-term current use of insulin    Mixed hyperlipidemia    Essential hypertension    Chronic neck pain    Chronic bilateral low back pain without sciatica    Other dysphagia    Anxiety and depression    Laryngospasms    Vocal cord dysfunction    Occasional tremors    Onychomycosis    Osteopenia of multiple sites    Lymphocytic colitis       Current Outpatient Medications on File Prior to Visit   Medication Sig Dispense Refill    amLODIPine (NORVASC) 2.5 MG tablet Take 1 tablet by mouth Daily.      Blood Glucose Monitoring Suppl (FreeStyle Lite) w/Device kit Use 1 Device Daily. Used to check blood glucose prn. 1 kit 0    Calcium-Magnesium-Vitamin D (CALCIUM MAGNESIUM PO) 0      Cholecalciferol (Vitamin D3) 50 MCG (2000 UT) capsule Take 1 capsule by mouth Daily.      empagliflozin (Jardiance) 10 MG tablet tablet Take 1 tablet by mouth Daily.  "30 tablet 6    fluorouracil (EFUDEX) 5 % cream Apply 1 Application topically to the appropriate area as directed Daily.      FREESTYLE LITE test strip 1 each by Other route As Needed.      lisinopril (PRINIVIL,ZESTRIL) 40 MG tablet Take 1 tablet by mouth Daily.      ondansetron (ZOFRAN) 4 MG tablet Take 1 tablet by mouth.      Probiotic Product (PROBIOTIC PO) 1 cap po qd      multivitamin (THERAGRAN) tablet tablet Take 1 tablet by mouth Daily. (Patient not taking: Reported on 5/13/2024)      Omega-3 Fatty Acids (fish oil) 1000 MG capsule capsule Take 1 capsule by mouth Daily. (Patient not taking: Reported on 5/13/2024)      [DISCONTINUED] hydroCHLOROthiazide (HYDRODIURIL) 25 MG tablet Take 1 tablet by mouth Daily.      [DISCONTINUED] omeprazole (priLOSEC) 40 MG capsule Take 1 capsule by mouth Daily.       No current facility-administered medications on file prior to visit.     Current outpatient and discharge medications have been reconciled for the patient.  Reviewed by: Jacqueline Bower DO      Allergies   Allergen Reactions    Acyclovir Hives and Rash    Turmeric GI Intolerance         Objective   Visit Vitals  /64 (BP Location: Right arm, Patient Position: Sitting, Cuff Size: Adult)   Pulse 70   Temp 98.9 °F (37.2 °C) (Skin)   Resp 16   Ht 157.5 cm (62\")   Wt 83.5 kg (184 lb)   SpO2 96%   BMI 33.65 kg/m²       Physical Exam  HENT:      Head: Normocephalic and atraumatic.   Eyes:      Conjunctiva/sclera: Conjunctivae normal.   Cardiovascular:      Rate and Rhythm: Normal rate and regular rhythm.      Heart sounds: Normal heart sounds.   Pulmonary:      Effort: Pulmonary effort is normal. No respiratory distress.      Breath sounds: Normal breath sounds. No wheezing, rhonchi or rales.   Skin:     Comments: - Patient has 5 trocar incisions across her abdomen.  All are scabbed over, no surrounding erythema, healing well   Neurological:      General: No focal deficit present.      Mental Status: She is alert and " oriented to person, place, and time.   Psychiatric:         Mood and Affect: Mood normal.         Behavior: Behavior normal.           Diagnoses and all orders for this visit:    1. Hospital discharge follow-up (Primary)/H/O hernia repair  -Patient doing well after surgery.  She is already followed up with surgeon and stopped PPIs for GERD  -Trocar incisions are healing well    3. Shortness of breath  -Told patient that I would be willing to do lung CT if her shortness of breath not resolve, especially since her shortness of breath concerned her    4. Vein symptom  -Patient to describe that some of her swelling and discomfort had decreased over the last few days.  Currently she had no discoloration of her skin but she had just a little bit of swelling still left on her right wrist  -Told patient that this will likely improve with time.  There were no findings on physical exam that were overtly concerning (no firmness felt under the skin, no redness or exquisite tenderness to palpation)    -Patient wanted to heal up a little bit before getting annual labs done.  Told her that that was fine    5. Essential hypertension  -Told patient that that was fine and I would go ahead and take over her medications  -Patient to request when she needs refills    6. Class 1 obesity due to excess calories with serious comorbidity and body mass index (BMI) of 33.0 to 33.9 in adult        Follow Up  - 6 months A1c check     Expected course, medications, and adverse effects discussed as appropriate.  Call or return if worsening or persistent symptoms.  I wore protective equipment throughout this patient encounter to include mask and eye protection. Hand hygiene was performed before donning protective equipment and after removal when leaving the room.    This document is intended for medical expert use only. Reading of this document by patients and/or patient's family without participating medical staff guidance may result in  misinterpretation and unintended morbidity. Any interpretation of such data is the responsibility of the patient and/or family member responsible for the patient in concert with their primary or specialist providers, not to be left for sources of online searches such as "[employer] or similar queries. Relying on these approaches to knowledge may result in misinterpretation, misguided goals of care and even death should patients or family members try recommendations outside of the realm of professional medical care.

## 2024-05-24 ENCOUNTER — OFFICE VISIT (OUTPATIENT)
Dept: PODIATRY | Facility: CLINIC | Age: 69
End: 2024-05-24
Payer: MEDICARE

## 2024-05-24 VITALS
HEART RATE: 58 BPM | HEIGHT: 62 IN | OXYGEN SATURATION: 98 % | BODY MASS INDEX: 33.86 KG/M2 | RESPIRATION RATE: 20 BRPM | WEIGHT: 184 LBS

## 2024-05-24 DIAGNOSIS — M79.671 PAIN IN RIGHT FOOT: Primary | ICD-10-CM

## 2024-05-24 DIAGNOSIS — L84 CALLUS OF FOOT: ICD-10-CM

## 2024-05-24 DIAGNOSIS — B35.1 ONYCHOMYCOSIS: ICD-10-CM

## 2024-05-24 DIAGNOSIS — E11.65 TYPE 2 DIABETES MELLITUS WITH HYPERGLYCEMIA, WITHOUT LONG-TERM CURRENT USE OF INSULIN: ICD-10-CM

## 2024-05-24 DIAGNOSIS — M77.51 BURSITIS OF RIGHT FOOT: ICD-10-CM

## 2024-05-24 DIAGNOSIS — M79.675 PAIN OF TOE OF LEFT FOOT: ICD-10-CM

## 2024-05-24 NOTE — PROGRESS NOTES
05/24/2024  Foot and Ankle Surgery - Established Patient/Follow-up  Provider: JOANNE Tony   Location: [facility]    Subjective:  Sneha Vega is a 69 y.o. female.     Chief Complaint   Patient presents with    Left Foot - Initial Evaluation, Diabetes, Callouses, Nail Problem     Dm foot check    Right Foot - Initial Evaluation, Diabetes, Callouses, Nail Problem     Dm foot check    Initial Evaluation     CAPO Bower last pcp visit 05/08/2024       History of Present Illness  The patient is a 68-year-old female who is here today to establish care for diabetic foot check.    The patient was referred to us by Dr. Kenney due to significant erythema, minor bleeding around her toenails, and calluses. Over the past few weeks, she has noticed a tender, puffy spot on her right foot, which is causing significant discomfort. She sought treatment from a dermatologist for toenail fungus, which was effective for a period of 2 weeks. However, the condition has recurred. She attempted to clean the area with alcohol, but believes the combination of alcohol and the antifungal medication exacerbated her skin tenderness, weeping, and bleeding. She expresses a desire to use the antifungal medication, but is unsure of its use. She reports nail curling, which impedes her ability to walk, necessitating the use of tennis shoes. She is uncertain if Medicare would cover the cost of this treatment. She has been advised by the diabetic class not to soak her feet. She has been soaking her feet and taking better care of her nails, but was advised against doing so. Her last A1c was 7.8 on 03/08/2024. Following a major surgery, she has experienced some weight loss, with her morning blood sugar levels decreasing. She anticipates regaining some weight and hopes to lose more. Her blood sugar level was 126 today, a decrease from her previous reading of 7. During her hospital stay, her blood sugar level was 185, and she was given  "insulin.    The patient denies any new injury to her right foot. She has been wearing sandals more often due to her inability to wear tennis shoes. She initially did not express concern until the onset of swelling, which she suspects may be indicative of an underlying issue.       Allergies   Allergen Reactions    Acyclovir Hives and Rash    Turmeric GI Intolerance       Current Outpatient Medications on File Prior to Visit   Medication Sig Dispense Refill    amLODIPine (NORVASC) 2.5 MG tablet Take 1 tablet by mouth Daily.      Blood Glucose Monitoring Suppl (FreeStyle Lite) w/Device kit Use 1 Device Daily. Used to check blood glucose prn. 1 kit 0    Calcium-Magnesium-Vitamin D (CALCIUM MAGNESIUM PO) 0      Cholecalciferol (Vitamin D3) 50 MCG (2000 UT) capsule Take 1 capsule by mouth Daily.      empagliflozin (Jardiance) 10 MG tablet tablet Take 1 tablet by mouth Daily. 30 tablet 6    fluorouracil (EFUDEX) 5 % cream Apply 1 Application topically to the appropriate area as directed Daily.      FREESTYLE LITE test strip 1 each by Other route As Needed.      lisinopril (PRINIVIL,ZESTRIL) 40 MG tablet Take 1 tablet by mouth Daily.      multivitamin (THERAGRAN) tablet tablet Take 1 tablet by mouth Daily.      Omega-3 Fatty Acids (fish oil) 1000 MG capsule capsule Take 1 capsule by mouth Daily.      Probiotic Product (PROBIOTIC PO) 1 cap po qd       No current facility-administered medications on file prior to visit.       Objective   Pulse 58   Resp 20   Ht 157.5 cm (62\")   Wt 83.5 kg (184 lb)   SpO2 98%   BMI 33.65 kg/m²     Foot/Ankle Exam    GENERAL  Appearance:  appears stated age  Orientation:  AAOx3  Affect:  appropriate  Gait:  unimpaired  Assistance:  independent  Right shoe gear: casual shoe and sandal  Left shoe gear: casual shoe and sandal    VASCULAR     Right Foot Vascularity   Dorsalis pedis:  2+  Skin temperature:  warm  Edema grading:  None  CFT:  < 3 seconds  Pedal hair growth:  " Absent  Varicosities:  mild varicosities     Left Foot Vascularity   Dorsalis pedis:  2+  Edema grading:  None  CFT:  < 3 seconds  Pedal hair growth:  Absent  Varicosities:  mild varicosities     NEUROLOGIC     Right Foot Neurologic   Light touch sensation: normal  Protective Sensation using Malone-Balbir Monofilament:   Sites intact: 10     Left Foot Neurologic   Light touch sensation: normal  Protective Sensation using Malone-Balbir Monofilament:   Sites intact: 10  Sites tested: 10    MUSCULOSKELETAL     Right Foot Musculoskeletal   Ecchymosis:  none  Tenderness:  lateral foot tenderness       Left Foot Musculoskeletal   Ecchymosis:  none  Tenderness:  toenail problem    DERMATOLOGIC      Right Foot Dermatologic   Skin  Positive for corn and skin changes.   Nails  1.  Positive for onychomycosis, abnormal thickness and dystrophic nail.  2.  Positive for onychomycosis, abnormal thickness, subungual debris and dystrophic nail.  3.  Positive for onychomycosis, abnormal thickness and dystrophic nail.  4.  Positive for onychomycosis, abnormal thickness and dystrophic nail.  5.  Positive for onychomycosis, abnormal thickness and dystrophic nail.     Left Foot Dermatologic   Skin  Positive for skin changes.   Nails  1.  Positive for onychomycosis, abnormal thickness, subungual debris and dystrophic nail.  2.  Positive for onychomycosis, abnormal thickness, subungual debris and dystrophic nail.  3.  Positive for onychomycosis, abnormal thickness and dystrophic nail.  4.  Positive for onychomycosis, abnormally thick and dystrophic nail.  5.  Positive for onychomycosis, abnormally thick and dystrophic nail.  Physical Exam  Specialty Testing  Diabetic foot exam: 10 out of 10 sensation noted on the left foot, 10 out of 10 on the right foot.       Results  Laboratory Studies  Last A1c was 7.8. Blood sugar was 126.     Assessment & Plan   Diagnoses and all orders for this visit:    1. Pain in right foot (Primary)  -      XR Foot 3+ View Right    2. Onychomycosis    3. Type 2 diabetes mellitus with hyperglycemia, without long-term current use of insulin    4. Pain of toe of left foot    5. Bursitis of right foot    6. Callus of foot      Assessment & Plan  1. Onychomycosis.  The patient was educated about nail dystrophy, and the potential impact of toenail fungus on nail beds, including scarring to the nail bed and the use of medications. The patient was informed that Penlac is not a viable cure for toenail fungus. She was advised to continue applying the topical antifungal medication as prescribed by her dermatologist.    2. Onychocryptosis.  The patient was educated about an ingrown toenail, which can cause pain, swelling, drainage, and potential infection. The patient was advised against digging, picking, or trimming the nail. She was advised to trim the nail straight across and file the edges. Epsom salt soaks were suggested as a potential treatment option. She was advised to soak her feet in Listerine, vinegar, and warm water, and to dry her feet between the toes. She was advised to trim the nails independently. She was advised to use a pumice stone or a peddyegg in one direction for gentle scrubbing. She was advised to wear tennis shoes with a soft upper to prevent rubbing. Should she continue to experience issues with her left great toenail, partial nail removal may be considered.    3. Right lateral foot pain.  The patient's calluses are well-managed. An x-ray of the right foot was ordered and independently reviewed with no obvious fracture. Recommend return to normal supportive footwear with otc insole for better support/stabilization. Discussed that pain/slight swelling could be due to overuse/strain of soft tissue of right lateral foot, or a bursitis.     4. Routitine diabetic foot check  Pt is at mild risk for pedal complications related to diabetes. Explained importance of diabetic foot care, daily foot checks, and  glycemic control. Patient should check both feet on a daily basis, monitor and control blood sugars, make sure that both feet and in between toes are towel dried after baths or showers. Avoid barefoot walking at all times.  I discussed wearing white socks and checking shoes before wearing them. Patient was given information on proper foot care. Call the office at the first signs of a wound or with signs of infection.        Follow up 2 months    Orders Placed This Encounter   Procedures    XR Foot 3+ View Right     Scheduling Instructions:      Will see after please     Order Specific Question:   Reason for Exam:     Answer:   right foot lateral pain. room 14.      Order Specific Question:   Does this patient have a diabetic monitoring/medication delivering device on?     Answer:   No     Order Specific Question:   Release to patient     Answer:   Routine Release [[id number]]          Patient or patient representative verbalized consent for the use of Ambient Listening during the visit with  JOANNE Smart for chart documentation. 5/24/2024  11:02 EDT

## 2024-05-28 ENCOUNTER — TELEPHONE (OUTPATIENT)
Dept: PODIATRY | Facility: CLINIC | Age: 69
End: 2024-05-28

## 2024-05-28 NOTE — TELEPHONE ENCOUNTER
Spoke with pt. She states the dx has been resolved. Tomeka said she would take a look at this but it may have to be removed by her pcp.

## 2024-05-28 NOTE — TELEPHONE ENCOUNTER
Caller: FRIEDA GLASS    Phone Number: [phone] (home)     Reason for Call:   PATIENT WOULD LIKE DX OF ANXIETY AND DEPRESSION REMOVED OFF HER CHART PATIENT HAS REACHED OUT TO [facility] WHO IS UNABLE TO MAKE THAT CHANGE. TOLD  PATIENT SHE WILL RECEIVE A FOLLOW UP CALL WITHIN 48 HOUR BUSINESS HOURS.

## 2024-06-04 DIAGNOSIS — I10 ESSENTIAL HYPERTENSION: Primary | ICD-10-CM

## 2024-06-04 RX ORDER — AMLODIPINE BESYLATE 2.5 MG/1
2.5 TABLET ORAL DAILY
Qty: 90 TABLET | OUTPATIENT
Start: 2024-06-04

## 2024-06-04 NOTE — TELEPHONE ENCOUNTER
Patient no longer see cardiology. She will need to have Dr. Bower take over filling both the Lisinopril and Amlodipine.

## 2024-06-04 NOTE — TELEPHONE ENCOUNTER
Cardiology has been filling this medication.  Can we call patient and ask if they want me to start taking this over?

## 2024-06-07 RX ORDER — AMLODIPINE BESYLATE 2.5 MG/1
2.5 TABLET ORAL DAILY
Qty: 90 TABLET | Refills: 1 | Status: SHIPPED | OUTPATIENT
Start: 2024-06-07

## 2024-06-07 NOTE — TELEPHONE ENCOUNTER
Refilling amlodipine 2.5 mg for 6 months we will check in on her next appointment on 11/14/2024   S Plasty Text: Given the location and shape of the defect, and the orientation of relaxed skin tension lines, an S-plasty was deemed most appropriate for repair.  Using a sterile surgical marker, the appropriate outline of the S-plasty was drawn, incorporating the defect and placing the expected incisions within the relaxed skin tension lines where possible.  The area thus outlined was incised deep to adipose tissue with a #15 scalpel blade.  The skin margins were undermined to an appropriate distance in all directions utilizing iris scissors. The skin flaps were advanced over the defect.  The opposing margins were then approximated with interrupted buried subcutaneous sutures.

## 2024-07-23 ENCOUNTER — OFFICE VISIT (OUTPATIENT)
Dept: PODIATRY | Facility: CLINIC | Age: 69
End: 2024-07-23
Payer: MEDICARE

## 2024-07-23 VITALS — OXYGEN SATURATION: 97 % | BODY MASS INDEX: 33.86 KG/M2 | WEIGHT: 184 LBS | HEIGHT: 62 IN | HEART RATE: 73 BPM

## 2024-07-23 DIAGNOSIS — M77.51 BURSITIS OF RIGHT FOOT: ICD-10-CM

## 2024-07-23 DIAGNOSIS — B35.1 ONYCHOMYCOSIS: ICD-10-CM

## 2024-07-23 DIAGNOSIS — E11.65 TYPE 2 DIABETES MELLITUS WITH HYPERGLYCEMIA, WITHOUT LONG-TERM CURRENT USE OF INSULIN: Primary | ICD-10-CM

## 2024-07-25 RX ORDER — CICLOPIROX 80 MG/ML
SOLUTION TOPICAL NIGHTLY
Qty: 1 EACH | Refills: 3 | Status: SHIPPED | OUTPATIENT
Start: 2024-07-25

## 2024-07-25 NOTE — PROGRESS NOTES
07/23/2024  Foot and Ankle Surgery - Established Patient/Follow-up  Provider: JOANNE Tony   Location: [facility]    Subjective:  Sneha Vega is a 69 y.o. female.     Chief Complaint   Patient presents with    Left Foot - Follow-up    Right Foot - Follow-up       History of Present Illness  The patient is a 68-year-old female who is here today for routine diabetic foot check and follow-up of toenail and foot pain.    She reports feeling well overall and appreciates the previously discussed treatments that were beneficial. She frequently wears her tennis shoes with insoles. She does not recall trying the Penlac nail lacquer. She attempted to use a dermatologist's topical nail lacquer, but it caused immediate irritation and weeping, leading her to discontinue its use. Her blood sugar levels have been stable, with her last reading taken a few weeks ago at 8:00 AM being 111. She experiences intermittent pain in her right big toe, which she trimmed today. She is attempting to allow the toenail to grow out, as it tends to protrude and her shoe exerts it, causing irritation. The toenail becomes weepy and crusty, causing itching and pain. She soaked the toenail in Epsom salt this morning. She can walk, albeit with tenderness. The side of her foot remains tender, albeit less severe than before. She has Voltaren pain gel at home. She wears flip flops when gardening.       Allergies   Allergen Reactions    Acyclovir Hives and Rash    Turmeric GI Intolerance       Current Outpatient Medications on File Prior to Visit   Medication Sig Dispense Refill    amLODIPine (NORVASC) 2.5 MG tablet Take 1 tablet by mouth Daily. 90 tablet 1    Blood Glucose Monitoring Suppl (FreeStyle Lite) w/Device kit Use 1 Device Daily. Used to check blood glucose prn. 1 kit 0    Calcium-Magnesium-Vitamin D (CALCIUM MAGNESIUM PO) 0      Cholecalciferol (Vitamin D3) 50 MCG (2000 UT) capsule Take 1 capsule by mouth Daily.       "empagliflozin (Jardiance) 10 MG tablet tablet Take 1 tablet by mouth Daily. 30 tablet 6    fluorouracil (EFUDEX) 5 % cream Apply 1 Application topically to the appropriate area as directed Daily.      FREESTYLE LITE test strip 1 each by Other route As Needed.      lisinopril (PRINIVIL,ZESTRIL) 40 MG tablet Take 1 tablet by mouth Daily.      multivitamin (THERAGRAN) tablet tablet Take 1 tablet by mouth Daily.      Omega-3 Fatty Acids (fish oil) 1000 MG capsule capsule Take 1 capsule by mouth Daily.      Probiotic Product (PROBIOTIC PO) 1 cap po qd       No current facility-administered medications on file prior to visit.       Objective   Pulse 73   Ht 157.5 cm (62\")   Wt 83.5 kg (184 lb)   SpO2 97%   BMI 33.65 kg/m²     Foot/Ankle Exam  Physical Exam         Results  Laboratory Studies  Morning blood sugar was 111.    Imaging  X-ray of the foot showed no abnormalities.     Assessment & Plan   Diagnoses and all orders for this visit:    1. Type 2 diabetes mellitus with hyperglycemia, without long-term current use of insulin (Primary)    2. Bursitis of right foot    3. Onychomycosis      Assessment & Plan  1. Diabetic foot check.  I reviewed x-ray with patient today. Patient continues to have mild to moderate right lateral foot pain, but acknowledges that she sometimes does not wear supportive footwear.     Bilateral feet appear stable with no open wounds or signs of active acute infection. I do feel patient is at mild risk for pedal complications related to diabetes. Patient is having toenail pain to bilateral great toes. I will recommend patient try Voltaren gel as needed and focus on wearing supportive footwear with over-the-counter arch supports and metatarsal pad. We discussed pathophysiology of onychomycosis and treatment options at length. Patient would like to try topical Penlac for treatment. We did discuss more definitive treatment with total nail removal with matrixectomy. She declined at this " time.    Explained importance of diabetic foot care, daily foot checks, and glycemic control. Patient should check both feet on a daily basis, monitor and control blood sugars, make sure that both feet and in between toes are towel dried after baths or showers. Avoid barefoot walking at all times.  I discussed wearing white socks and checking shoes before wearing them. Patient was given information on proper foot care. Call the office at the first signs of a wound or with signs of infection.     Follow-up  The patient will follow up with Dr. Braun in 6 weeks regarding persistent right lateral foot pain.       No orders of the defined types were placed in this encounter.         Patient or patient representative verbalized consent for the use of Ambient Listening during the visit with  JOANNE Smart for chart documentation. 7/25/2024  10:03 EDT

## 2024-08-21 ENCOUNTER — OFFICE (OUTPATIENT)
Age: 69
End: 2024-08-21

## 2024-08-21 ENCOUNTER — OFFICE (OUTPATIENT)
Dept: URBAN - METROPOLITAN AREA CLINIC 64 | Facility: CLINIC | Age: 69
End: 2024-08-21

## 2024-08-21 VITALS
DIASTOLIC BLOOD PRESSURE: 56 MMHG | HEIGHT: 66 IN | WEIGHT: 175 LBS | SYSTOLIC BLOOD PRESSURE: 97 MMHG | SYSTOLIC BLOOD PRESSURE: 97 MMHG | HEIGHT: 66 IN | HEART RATE: 79 BPM | DIASTOLIC BLOOD PRESSURE: 56 MMHG | DIASTOLIC BLOOD PRESSURE: 56 MMHG | HEART RATE: 79 BPM | HEART RATE: 79 BPM | WEIGHT: 175 LBS | WEIGHT: 175 LBS | SYSTOLIC BLOOD PRESSURE: 97 MMHG | HEIGHT: 66 IN | HEART RATE: 79 BPM | DIASTOLIC BLOOD PRESSURE: 56 MMHG | DIASTOLIC BLOOD PRESSURE: 56 MMHG | SYSTOLIC BLOOD PRESSURE: 97 MMHG | HEART RATE: 79 BPM | HEART RATE: 79 BPM | WEIGHT: 175 LBS | SYSTOLIC BLOOD PRESSURE: 97 MMHG | SYSTOLIC BLOOD PRESSURE: 97 MMHG | HEIGHT: 66 IN | HEIGHT: 66 IN | SYSTOLIC BLOOD PRESSURE: 97 MMHG | WEIGHT: 175 LBS | WEIGHT: 175 LBS | HEART RATE: 79 BPM | WEIGHT: 175 LBS | HEIGHT: 66 IN | DIASTOLIC BLOOD PRESSURE: 56 MMHG | HEIGHT: 66 IN | DIASTOLIC BLOOD PRESSURE: 56 MMHG

## 2024-08-21 DIAGNOSIS — K44.9 DIAPHRAGMATIC HERNIA WITHOUT OBSTRUCTION OR GANGRENE: ICD-10-CM

## 2024-08-21 DIAGNOSIS — K59.00 CONSTIPATION, UNSPECIFIED: ICD-10-CM

## 2024-08-21 PROCEDURE — 99212 OFFICE O/P EST SF 10 MIN: CPT | Performed by: NURSE PRACTITIONER

## 2024-09-03 ENCOUNTER — OFFICE VISIT (OUTPATIENT)
Dept: PODIATRY | Facility: CLINIC | Age: 69
End: 2024-09-03
Payer: MEDICARE

## 2024-09-03 VITALS — BODY MASS INDEX: 33.86 KG/M2 | WEIGHT: 184 LBS | HEIGHT: 62 IN | HEART RATE: 72 BPM

## 2024-09-03 DIAGNOSIS — M20.5X1 HALLUX LIMITUS, RIGHT: ICD-10-CM

## 2024-09-03 DIAGNOSIS — E11.65 TYPE 2 DIABETES MELLITUS WITH HYPERGLYCEMIA, WITHOUT LONG-TERM CURRENT USE OF INSULIN: ICD-10-CM

## 2024-09-03 DIAGNOSIS — M79.671 PAIN IN RIGHT FOOT: Primary | ICD-10-CM

## 2024-09-03 DIAGNOSIS — M76.71 PERONEAL TENDINITIS, RIGHT: ICD-10-CM

## 2024-09-03 DIAGNOSIS — M21.861 ACQUIRED POSTERIOR EQUINUS, RIGHT: ICD-10-CM

## 2024-09-03 RX ORDER — FAMOTIDINE 20 MG/1
20 TABLET, FILM COATED ORAL 2 TIMES DAILY
COMMUNITY

## 2024-09-04 NOTE — PROGRESS NOTES
09/03/2024  Foot and Ankle Surgery - Established Patient/Follow-up  Provider: Dr. Andres Braun DPM  Location: [facility]    Subjective:  Sneha Vega is a 69 y.o. female.     Chief Complaint   Patient presents with    Right Foot - Initial Evaluation, Pain     Pain 0    Follow-up     Last saw CAPO Bower DO 3/8/2024       History of Present Illness  The patient presents for evaluation of multiple medical concerns.    She reports a tendency to roll her ankles, particularly on one side, which she believes may have initiated her current discomfort. She recalls an instance where she rolled her ankle severely enough to dislodge a piece of bone. Additionally, she mentions a past incident where a horse rolled onto her leg, causing significant injury and subsequent immobility. This led to her favoring the leg and walking abnormally, resulting in decreased mobility. She has been making an effort to wear supportive tennis shoes more frequently and uses arch supports. She also wears supportive house slippers at home due to her diabetes. She enjoys walking and has even resorted to placing inserts inside her socks to ensure constant support. She is interested in trying Yoga Toes, an orthotic device designed to stretch the toes, and wonders if this could help with her foot alignment. She also mentions that one of her hips is misaligned, which she believes affects her back, shoulder, neck, and entire right side.    She experiences intermittent tenderness around her toenails, although they are currently not causing discomfort. She feels as though her toenails are growing into her toes, with the nail on her right toe being thicker than the one on her left. She used to trim these nails herself but was advised against it by Tomeka. She soaks her feet and uses a metal file to manage the thickness of her toenails.    Her last A1c was 7, but she has lost weight since then and thinks it is going to be better.    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      Allergies   Allergen Reactions    Acyclovir Hives and Rash    Turmeric GI Intolerance       Current Outpatient Medications on File Prior to Visit   Medication Sig Dispense Refill    amLODIPine (NORVASC) 2.5 MG tablet Take 1 tablet by mouth Daily. 90 tablet 1    Blood Glucose Monitoring Suppl (FreeStyle Lite) w/Device kit Use 1 Device Daily. Used to check blood glucose prn. 1 kit 0    Calcium-Magnesium-Vitamin D (CALCIUM MAGNESIUM PO) 0      Cholecalciferol (Vitamin D3) 50 MCG (2000 UT) capsule Take 1 capsule by mouth Daily.      empagliflozin (Jardiance) 10 MG tablet tablet Take 1 tablet by mouth Daily. 30 tablet 6    famotidine (PEPCID) 20 MG tablet Take 1 tablet by mouth 2 (Two) Times a Day.      fluorouracil (EFUDEX) 5 % cream Apply 1 Application topically to the appropriate area as directed Daily.      FREESTYLE LITE test strip 1 each by Other route As Needed.      lisinopril (PRINIVIL,ZESTRIL) 40 MG tablet Take 1 tablet by mouth Daily.      multivitamin (THERAGRAN) tablet tablet Take 1 tablet by mouth Daily.      Omega-3 Fatty Acids (fish oil) 1000 MG capsule capsule Take 1 capsule by mouth Daily.      Probiotic Product (PROBIOTIC PO) 1 cap po qd      ciclopirox (PENLAC) 8 % solution Apply  topically to the appropriate area as directed Every Night. (Patient not taking: Reported on 9/3/2024) 1 each 3     No current facility-administered medications on file prior to visit.       Objective   Pulse 72   Ht 157.5 cm (62\")   Wt 83.5 kg (184 lb)   BMI 33.65 kg/m²     Foot/Ankle Exam    GENERAL  Appearance:  appears stated age  Orientation:  AAOx3  Affect:  appropriate    VASCULAR     Right Foot Vascularity   Normal vascular exam    Dorsalis pedis:  2+  Posterior tibial:  2+  Skin temperature:  warm  Edema grading:  None  CFT:  < 3 seconds  Pedal hair growth:  Present  Varicosities:  none     Left Foot Vascularity   Normal vascular exam    Dorsalis " pedis:  2+  Posterior tibial:  2+  Skin temperature:  warm  Edema grading:  None  CFT:  < 3 seconds  Pedal hair growth:  Present  Varicosities:  none     NEUROLOGIC     Right Foot Neurologic   Light touch sensation: normal  Hot/Cold sensation: normal  Achilles reflex:  2+     Left Foot Neurologic   Light touch sensation: normal  Hot/Cold sensation:  normal  Achilles reflex:  2+    MUSCULOSKELETAL     Right Foot Musculoskeletal   Ecchymosis:  none  Tenderness:  fifth metatarsal base tenderness and peroneal tendon tenderness    Arch:  Normal     Left Foot Musculoskeletal   Ecchymosis:  none  Tenderness:  none  Arch:  Normal    MUSCLE STRENGTH     Right Foot Muscle Strength   Normal strength    Foot dorsiflexion:  5  Foot plantar flexion:  5  Foot inversion:  5  Foot eversion:  5     Left Foot Muscle Strength   Normal strength    Foot dorsiflexion:  5  Foot plantar flexion:  5  Foot inversion:  5  Foot eversion:  5    DERMATOLOGIC      Right Foot Dermatologic   Skin  Right foot skin is intact.   Nails comment:  Nails 1-5     Left Foot Dermatologic   Skin  Left foot skin is intact.   Nails comment:  Nails 1-5    TESTS     Right Foot Tests   Anterior drawer: negative  Varus tilt: negative     Left Foot Tests   Anterior drawer: negative  Varus tilt: negative    Physical Exam        Results  Imaging  X-ray of the foot shows a small calcification in the tendon that inserts to the bone. Arthritis observed on the front of the ankle and big toe joint. The back of the heel and the front of the foot are not on the same weightbearing surface.    Assessment & Plan   Diagnoses and all orders for this visit:    1. Pain in right foot (Primary)    2. Peroneal tendinitis, right    3. Acquired posterior equinus, right    4. Hallux limitus, right    5. Type 2 diabetes mellitus with hyperglycemia, without long-term current use of insulin      Assessment & Plan    Symptoms and physical examination findings are consistent with insertional  peroneal tendinitis. A regimen of stretching exercises was recommended, including specific stretches for the Achilles tendon. Low-impact exercises such as biking, elliptical training, swimming, and water aerobics were advised. Supportive footwear, specifically tennis shoes with arch supports, was suggested. The use of Yoga Toes was also discussed as a beneficial option.      Symptoms and physical examination findings indicate an ingrown toenail. Management with Epsom salt soaks and proper foot care was recommended. The possibility of toenail removal was discussed if symptoms persist or worsen. Given the patient's diabetes, careful monitoring was advised, but the current risk is minimal.      The patient's last A1c was 7. She has lost weight since then and expects improved results. Continued self-care and monitoring of blood sugar levels were advised. Explained importance of diabetic foot care, daily foot checks, and glycemic control. Patient should check both feet on a daily basis, monitor and control blood sugars, make sure that both feet and in between toes are towel dried after baths or showers. Avoid barefoot walking at all times.  I discussed wearing white socks and checking shoes before wearing them. Patient was given information on proper foot care. Call the office at the first signs of a wound or with signs of infection.    Reviewed proper basic stretching and manual therapy exercises along with appropriate shoes and activity.  Discussed proper use and/or avoidance of OTC anti-inflammatories.  Patient is to call with any additional issues or concerns.  Greater than 20 minutes was spent before, during, and after evaluation for patient care.  Patient is to follow-up with nurse practitioner in 6 months for routine footcare.                 Patient or patient representative verbalized consent for the use of Ambient Listening during the visit with  GLENROY Braun DPM for chart documentation. 9/4/2024  07:07  MURTAZA Braun, CLIVEM

## 2024-09-05 ENCOUNTER — PATIENT ROUNDING (BHMG ONLY) (OUTPATIENT)
Dept: ORTHOPEDIC SURGERY | Facility: CLINIC | Age: 69
End: 2024-09-05
Payer: MEDICARE

## 2024-09-05 NOTE — PROGRESS NOTES
A my chart message has been sent to the patient for PATIENT ROUNDING with [facility]

## 2024-09-06 ENCOUNTER — PATIENT ROUNDING (BHMG ONLY) (OUTPATIENT)
Dept: PODIATRY | Facility: CLINIC | Age: 69
End: 2024-09-06
Payer: MEDICARE

## 2024-09-06 DIAGNOSIS — E11.65 TYPE 2 DIABETES MELLITUS WITH HYPERGLYCEMIA, WITHOUT LONG-TERM CURRENT USE OF INSULIN: Primary | ICD-10-CM

## 2024-09-09 ENCOUNTER — LAB (OUTPATIENT)
Dept: LAB | Facility: HOSPITAL | Age: 69
End: 2024-09-09
Payer: MEDICARE

## 2024-09-09 DIAGNOSIS — E11.65 TYPE 2 DIABETES MELLITUS WITH HYPERGLYCEMIA, WITHOUT LONG-TERM CURRENT USE OF INSULIN: ICD-10-CM

## 2024-09-09 LAB
ALBUMIN SERPL-MCNC: 4.1 G/DL (ref 3.5–5.2)
ALBUMIN UR-MCNC: <1.2 MG/DL
ALBUMIN/GLOB SERPL: 1.5 G/DL
ALP SERPL-CCNC: 89 U/L (ref 39–117)
ALT SERPL W P-5'-P-CCNC: 15 U/L (ref 1–33)
ANION GAP SERPL CALCULATED.3IONS-SCNC: 11.8 MMOL/L (ref 5–15)
AST SERPL-CCNC: 15 U/L (ref 1–32)
BILIRUB SERPL-MCNC: 0.3 MG/DL (ref 0–1.2)
BUN SERPL-MCNC: 17 MG/DL (ref 8–23)
BUN/CREAT SERPL: 20.2 (ref 7–25)
CALCIUM SPEC-SCNC: 9.3 MG/DL (ref 8.6–10.5)
CHLORIDE SERPL-SCNC: 100 MMOL/L (ref 98–107)
CHOLEST SERPL-MCNC: 193 MG/DL (ref 0–200)
CO2 SERPL-SCNC: 23.2 MMOL/L (ref 22–29)
CREAT SERPL-MCNC: 0.84 MG/DL (ref 0.57–1)
CREAT UR-MCNC: 25.5 MG/DL
EGFRCR SERPLBLD CKD-EPI 2021: 75.3 ML/MIN/1.73
GLOBULIN UR ELPH-MCNC: 2.7 GM/DL
GLUCOSE SERPL-MCNC: 141 MG/DL (ref 65–99)
HBA1C MFR BLD: 7.67 % (ref 4.8–5.6)
HDLC SERPL-MCNC: 51 MG/DL (ref 40–60)
LDLC SERPL CALC-MCNC: 121 MG/DL (ref 0–100)
LDLC/HDLC SERPL: 2.32 {RATIO}
MICROALBUMIN/CREAT UR: NORMAL MG/G{CREAT}
POTASSIUM SERPL-SCNC: 5.1 MMOL/L (ref 3.5–5.2)
PROT SERPL-MCNC: 6.8 G/DL (ref 6–8.5)
SODIUM SERPL-SCNC: 135 MMOL/L (ref 136–145)
TRIGL SERPL-MCNC: 119 MG/DL (ref 0–150)
VLDLC SERPL-MCNC: 21 MG/DL (ref 5–40)

## 2024-09-09 PROCEDURE — 82570 ASSAY OF URINE CREATININE: CPT

## 2024-09-09 PROCEDURE — 80061 LIPID PANEL: CPT

## 2024-09-09 PROCEDURE — 83036 HEMOGLOBIN GLYCOSYLATED A1C: CPT

## 2024-09-09 PROCEDURE — 82043 UR ALBUMIN QUANTITATIVE: CPT

## 2024-09-09 PROCEDURE — 80053 COMPREHEN METABOLIC PANEL: CPT

## 2024-09-09 PROCEDURE — 36415 COLL VENOUS BLD VENIPUNCTURE: CPT

## 2024-09-12 RX ORDER — LANCETS 28 GAUGE
EACH MISCELLANEOUS
COMMUNITY
Start: 2024-07-28 | End: 2024-09-16 | Stop reason: SDUPTHER

## 2024-09-16 ENCOUNTER — OFFICE VISIT (OUTPATIENT)
Dept: ENDOCRINOLOGY | Facility: CLINIC | Age: 69
End: 2024-09-16
Payer: MEDICARE

## 2024-09-16 VITALS
BODY MASS INDEX: 32.02 KG/M2 | OXYGEN SATURATION: 98 % | HEART RATE: 65 BPM | DIASTOLIC BLOOD PRESSURE: 60 MMHG | WEIGHT: 174 LBS | HEIGHT: 62 IN | SYSTOLIC BLOOD PRESSURE: 120 MMHG

## 2024-09-16 DIAGNOSIS — I10 ESSENTIAL HYPERTENSION: ICD-10-CM

## 2024-09-16 DIAGNOSIS — E78.2 MIXED HYPERLIPIDEMIA: ICD-10-CM

## 2024-09-16 DIAGNOSIS — E11.65 TYPE 2 DIABETES MELLITUS WITH HYPERGLYCEMIA, WITHOUT LONG-TERM CURRENT USE OF INSULIN: Primary | ICD-10-CM

## 2024-09-16 LAB — GLUCOSE BLDC GLUCOMTR-MCNC: 118 MG/DL (ref 70–105)

## 2024-09-16 PROCEDURE — 1160F RVW MEDS BY RX/DR IN RCRD: CPT | Performed by: INTERNAL MEDICINE

## 2024-09-16 PROCEDURE — 82948 REAGENT STRIP/BLOOD GLUCOSE: CPT | Performed by: INTERNAL MEDICINE

## 2024-09-16 PROCEDURE — 1159F MED LIST DOCD IN RCRD: CPT | Performed by: INTERNAL MEDICINE

## 2024-09-16 PROCEDURE — 3074F SYST BP LT 130 MM HG: CPT | Performed by: INTERNAL MEDICINE

## 2024-09-16 PROCEDURE — 99214 OFFICE O/P EST MOD 30 MIN: CPT | Performed by: INTERNAL MEDICINE

## 2024-09-16 PROCEDURE — 3051F HG A1C>EQUAL 7.0%<8.0%: CPT | Performed by: INTERNAL MEDICINE

## 2024-09-16 PROCEDURE — 3078F DIAST BP <80 MM HG: CPT | Performed by: INTERNAL MEDICINE

## 2024-09-16 PROCEDURE — G2211 COMPLEX E/M VISIT ADD ON: HCPCS | Performed by: INTERNAL MEDICINE

## 2024-09-16 RX ORDER — BLOOD-GLUCOSE METER
KIT MISCELLANEOUS
Qty: 100 EACH | Refills: 4 | Status: SHIPPED | OUTPATIENT
Start: 2024-09-16

## 2024-09-16 RX ORDER — LANCETS 28 GAUGE
EACH MISCELLANEOUS
Qty: 100 EACH | Refills: 3 | Status: SHIPPED | OUTPATIENT
Start: 2024-09-16

## 2024-09-16 RX ORDER — LISINOPRIL 40 MG/1
40 TABLET ORAL DAILY
Qty: 90 TABLET | Refills: 3 | Status: SHIPPED | OUTPATIENT
Start: 2024-09-16

## 2024-10-21 ENCOUNTER — OFFICE VISIT (OUTPATIENT)
Dept: FAMILY MEDICINE CLINIC | Facility: CLINIC | Age: 69
End: 2024-10-21
Payer: MEDICARE

## 2024-10-21 VITALS
WEIGHT: 173.8 LBS | HEIGHT: 62 IN | OXYGEN SATURATION: 95 % | HEART RATE: 74 BPM | TEMPERATURE: 97.3 F | SYSTOLIC BLOOD PRESSURE: 108 MMHG | DIASTOLIC BLOOD PRESSURE: 72 MMHG | BODY MASS INDEX: 31.98 KG/M2

## 2024-10-21 DIAGNOSIS — I10 ESSENTIAL HYPERTENSION: ICD-10-CM

## 2024-10-21 DIAGNOSIS — M85.89 OSTEOPENIA OF MULTIPLE SITES: ICD-10-CM

## 2024-10-21 DIAGNOSIS — K21.9 GASTROESOPHAGEAL REFLUX DISEASE, UNSPECIFIED WHETHER ESOPHAGITIS PRESENT: ICD-10-CM

## 2024-10-21 DIAGNOSIS — E55.9 VITAMIN D DEFICIENCY: ICD-10-CM

## 2024-10-21 DIAGNOSIS — Z23 FLU VACCINE NEED: ICD-10-CM

## 2024-10-21 DIAGNOSIS — E78.2 MIXED HYPERLIPIDEMIA: ICD-10-CM

## 2024-10-21 DIAGNOSIS — E11.65 TYPE 2 DIABETES MELLITUS WITH HYPERGLYCEMIA, WITHOUT LONG-TERM CURRENT USE OF INSULIN: Primary | ICD-10-CM

## 2024-10-21 PROCEDURE — 90662 IIV NO PRSV INCREASED AG IM: CPT | Performed by: NURSE PRACTITIONER

## 2024-10-21 PROCEDURE — 3078F DIAST BP <80 MM HG: CPT | Performed by: NURSE PRACTITIONER

## 2024-10-21 PROCEDURE — 99214 OFFICE O/P EST MOD 30 MIN: CPT | Performed by: NURSE PRACTITIONER

## 2024-10-21 PROCEDURE — 3074F SYST BP LT 130 MM HG: CPT | Performed by: NURSE PRACTITIONER

## 2024-10-21 PROCEDURE — G0008 ADMIN INFLUENZA VIRUS VAC: HCPCS | Performed by: NURSE PRACTITIONER

## 2024-10-21 PROCEDURE — 3051F HG A1C>EQUAL 7.0%<8.0%: CPT | Performed by: NURSE PRACTITIONER

## 2024-10-21 NOTE — PROGRESS NOTES
Subjective   Sneha Vega is a 69 y.o. female presents for   Chief Complaint   Patient presents with    [facility]     No questions or concerns today       Health Maintenance Due   Topic Date Due    HEPATITIS C SCREENING  Never done    DIABETIC EYE EXAM  12/05/2024       History of Present Illness  The patient is a 69-year-old female who presents to [facility].    She has been under the care of an endocrinologist, Dr. Snow, for her diabetes, with biannual visits and regular blood work. She has requested that Dr. Snow take over her diabetic supplies and lisinopril prescription.    She underwent surgery in May 2024, which prevented her from completing a 25-hydroxy vitamin D test ordered by her previous nurse practitioner. She was diagnosed with osteopenia following a bone density test and was advised to take 1500 mg of calcium daily, which she has not yet started. She takes a daily magnesium-calcium supplement and has ordered additional calcium capsules.    She had a mastectomy in 1999, during which two lymph nodes were removed. She does not currently follow up with hematology or oncology but continues to have mammograms on her right side.    She underwent surgery for a hiatal hernia and Nissen fundoplication, which was expected to reduce her need for omeprazole and stop her reflux. However, she still experiences reflux and is trying Gaviscon and RefluxRaft. She plans to see her surgeon this week.    She contracted COVID-19 twice, once in 2020 and again after traveling to [facility]. She experienced severe post-COVID symptoms, including heart palpitations and high blood pressure, after receiving her last COVID booster in April 2024. She was recently discharged from her cardiologist's care.    She reports no snoring or symptoms of sleep apnea and sleeps with her head elevated.    She has not had a Pap smear in two years and has experienced some itching, which she attributes to aging  "tissue.    IMMUNIZATIONS  She has received COVID-19 vaccines and boosters.    Vitals:    10/21/24 1329   BP: 108/72   BP Location: Right arm   Patient Position: Sitting   Cuff Size: Large Adult   Pulse: 74   Temp: 97.3 °F (36.3 °C)   TempSrc: Tympanic   SpO2: 95%   Weight: 78.8 kg (173 lb 12.8 oz)   Height: 157.5 cm (62.01\")     Body mass index is 31.78 kg/m².    Current Outpatient Medications on File Prior to Visit   Medication Sig Dispense Refill    aluminum hydroxide-magnesium carbonate (GAVISCON)  MG/15ML suspension oral suspension Take 15 mL by mouth 3 (Three) Times a Day With Meals.      amLODIPine (NORVASC) 2.5 MG tablet Take 1 tablet by mouth Daily. 90 tablet 1    Blood Glucose Monitoring Suppl (FreeStyle Lite) w/Device kit Use 1 Device Daily. Used to check blood glucose prn. 1 kit 0    Calcium-Magnesium-Vitamin D (CALCIUM MAGNESIUM PO) 0      Cholecalciferol (Vitamin D3) 50 MCG (2000 UT) capsule Take 1 capsule by mouth Daily.      empagliflozin (Jardiance) 10 MG tablet tablet Take 1 tablet by mouth Daily. 90 tablet 3    fluorouracil (EFUDEX) 5 % cream Apply 1 Application topically to the appropriate area as directed Daily.      FREESTYLE LITE test strip Used to test blood sugars at least 1 time per day. 100 each 4    Lancets (freestyle) lancets Used to test blood sugars at least 1 time per day. 100 each 3    lisinopril (PRINIVIL,ZESTRIL) 40 MG tablet Take 1 tablet by mouth Daily. 90 tablet 3    Omega-3 Fatty Acids (fish oil) 1000 MG capsule capsule Take 1 capsule by mouth Daily.      Probiotic Product (PROBIOTIC PO) 1 cap po qd      psyllium (METAMUCIL) 0.52 g capsule 0      multivitamin (THERAGRAN) tablet tablet Take 1 tablet by mouth Daily. (Patient not taking: Reported on 10/21/2024)       No current facility-administered medications on file prior to visit.       The following portions of the patient's history were reviewed and updated as appropriate: allergies, current medications, past family " history, past medical history, past social history, past surgical history, and problem list.    Review of Systems    Objective   Physical Exam  Vitals and nursing note reviewed.   Constitutional:       Appearance: Normal appearance. She is well-developed. She is obese.   HENT:      Head: Normocephalic and atraumatic.      Right Ear: Tympanic membrane, ear canal and external ear normal.      Left Ear: Tympanic membrane, ear canal and external ear normal.      Nose: Nose normal.   Eyes:      Extraocular Movements: Extraocular movements intact.      Conjunctiva/sclera: Conjunctivae normal.      Pupils: Pupils are equal, round, and reactive to light.   Cardiovascular:      Rate and Rhythm: Normal rate and regular rhythm.      Pulses: Normal pulses.      Heart sounds: Normal heart sounds.   Pulmonary:      Effort: Pulmonary effort is normal.      Breath sounds: Normal breath sounds.   Abdominal:      General: Bowel sounds are normal.      Palpations: Abdomen is soft.   Genitourinary:     Vagina: Normal.   Musculoskeletal:         General: Normal range of motion.      Cervical back: Normal range of motion and neck supple.   Skin:     General: Skin is warm and dry.   Neurological:      General: No focal deficit present.      Mental Status: She is alert and oriented to person, place, and time.   Psychiatric:         Mood and Affect: Mood normal.         Behavior: Behavior normal.         Judgment: Judgment normal.              PHQ-9 Total Score:      Results  Laboratory Studies  T score for bone density test is -0.4. Hemoglobin was 11.8 in May.    Assessment & Plan   Diagnoses and all orders for this visit:    1. Type 2 diabetes mellitus with hyperglycemia, without long-term current use of insulin (Primary)  -     CBC Auto Differential; Future  -     Comprehensive Metabolic Panel; Future  -     Hemoglobin A1c; Future  -     Lipid Panel; Future  -     Microalbumin / Creatinine Urine Ratio - Urine, Clean Catch; Future  -      TSH; Future  -     Magnesium; Future    2. Vitamin D deficiency  -     Vitamin D,25-Hydroxy; Future    3. Flu vaccine need  -     Fluzone High-Dose 65+yrs (9179-4122)    4. Mixed hyperlipidemia    5. Essential hypertension    6. Osteopenia of multiple sites    7. Gastroesophageal reflux disease, unspecified whether esophagitis present         1. Diabetes Mellitus.  She will be seen every 3 months for lab evaluations to align with her endocrinologist visits. Labs including CBC, A1c, and magnesium will be checked in mid-December 2024. She is advised to continue her current diabetic supplies management with Dr. Snow.    2. Osteopenia.  Her T-score of -0.4 indicates osteopenia. A regimen of weight-bearing exercise, 1200 mg of calcium, and 2000 IU of vitamin D was recommended. She prefers to get calcium through diet and was provided with a list of calcium-rich foods. A magnesium level will be checked during her next lab visit.    3. Gastroesophageal Reflux Disease (GERD).  She continues to experience reflux despite surgery. She is currently trying Gaviscon and Reflux Raft. If these do not alleviate her symptoms, she may need to resume omeprazole. She will follow up with her surgeon this week to discuss further management options.    4. Anemia.  A slight anemia was noted in May 2024 with a hemoglobin level of 11.8. A CBC will be checked in mid-December 2024 to reassess her hemoglobin levels.    5. Fatigue.  She reports ongoing fatigue and low motivation, potentially related to low vitamin D levels. A 25-hydroxy vitamin D test will be performed during her next lab visit to determine if adjustments to her vitamin D supplementation are needed.    6. Hypertension.  She is currently on lisinopril, managed by Dr. Chavis. She has sufficient supply of amlodipine but will request a refill when needed.    7. Health Maintenance.  She will receive her influenza vaccine today. She prefers to space out her vaccines and will get the  RSV vaccine in 2 weeks. She declined the COVID-19 vaccine due to previous adverse reactions. A Pap smear is recommended every 5 years unless there are specific concerns.    Follow-up  Return in mid-December 2024 or early January 2025 for follow-up and lab evaluations.    There are no Patient Instructions on file for this visit.         Patient or patient representative verbalized consent for the use of Ambient Listening during the visit with  JOANNE Meehan for chart documentation. 10/23/2024  23:11 EDT

## 2024-11-07 ENCOUNTER — PATIENT ROUNDING (BHMG ONLY) (OUTPATIENT)
Dept: FAMILY MEDICINE CLINIC | Facility: CLINIC | Age: 69
End: 2024-11-07
Payer: MEDICARE

## 2024-11-07 NOTE — PROGRESS NOTES
A iPow[employer] message has been sent to the patient for patient [place] [facility]

## 2024-12-16 ENCOUNTER — LAB (OUTPATIENT)
Dept: FAMILY MEDICINE CLINIC | Facility: CLINIC | Age: 69
End: 2024-12-16
Payer: MEDICARE

## 2024-12-16 DIAGNOSIS — E55.9 VITAMIN D DEFICIENCY: ICD-10-CM

## 2024-12-16 DIAGNOSIS — E11.65 TYPE 2 DIABETES MELLITUS WITH HYPERGLYCEMIA, WITHOUT LONG-TERM CURRENT USE OF INSULIN: ICD-10-CM

## 2024-12-16 LAB
25(OH)D3 SERPL-MCNC: 33.3 NG/ML (ref 30–100)
ALBUMIN SERPL-MCNC: 4.1 G/DL (ref 3.5–5.2)
ALBUMIN UR-MCNC: <1.2 MG/DL
ALBUMIN/GLOB SERPL: 1.4 G/DL
ALP SERPL-CCNC: 83 U/L (ref 39–117)
ALT SERPL W P-5'-P-CCNC: 11 U/L (ref 1–33)
ANION GAP SERPL CALCULATED.3IONS-SCNC: 11.3 MMOL/L (ref 5–15)
AST SERPL-CCNC: 16 U/L (ref 1–32)
BASOPHILS # BLD AUTO: 0.04 10*3/MM3 (ref 0–0.2)
BASOPHILS NFR BLD AUTO: 0.7 % (ref 0–1.5)
BILIRUB SERPL-MCNC: 0.3 MG/DL (ref 0–1.2)
BUN SERPL-MCNC: 15 MG/DL (ref 8–23)
BUN/CREAT SERPL: 20.3 (ref 7–25)
CALCIUM SPEC-SCNC: 9.2 MG/DL (ref 8.6–10.5)
CHLORIDE SERPL-SCNC: 100 MMOL/L (ref 98–107)
CHOLEST SERPL-MCNC: 207 MG/DL (ref 0–200)
CO2 SERPL-SCNC: 23.7 MMOL/L (ref 22–29)
CREAT SERPL-MCNC: 0.74 MG/DL (ref 0.57–1)
CREAT UR-MCNC: 64.9 MG/DL
DEPRECATED RDW RBC AUTO: 39.9 FL (ref 37–54)
EGFRCR SERPLBLD CKD-EPI 2021: 87.7 ML/MIN/1.73
EOSINOPHIL # BLD AUTO: 0.06 10*3/MM3 (ref 0–0.4)
EOSINOPHIL NFR BLD AUTO: 1.1 % (ref 0.3–6.2)
ERYTHROCYTE [DISTWIDTH] IN BLOOD BY AUTOMATED COUNT: 12.2 % (ref 12.3–15.4)
GLOBULIN UR ELPH-MCNC: 2.9 GM/DL
GLUCOSE SERPL-MCNC: 137 MG/DL (ref 65–99)
HBA1C MFR BLD: 7.1 % (ref 4.8–5.6)
HCT VFR BLD AUTO: 40 % (ref 34–46.6)
HDLC SERPL-MCNC: 59 MG/DL (ref 40–60)
HGB BLD-MCNC: 12.8 G/DL (ref 12–15.9)
IMM GRANULOCYTES # BLD AUTO: 0.01 10*3/MM3 (ref 0–0.05)
IMM GRANULOCYTES NFR BLD AUTO: 0.2 % (ref 0–0.5)
LDLC SERPL CALC-MCNC: 131 MG/DL (ref 0–100)
LDLC/HDLC SERPL: 2.19 {RATIO}
LYMPHOCYTES # BLD AUTO: 2.11 10*3/MM3 (ref 0.7–3.1)
LYMPHOCYTES NFR BLD AUTO: 38.1 % (ref 19.6–45.3)
MAGNESIUM SERPL-MCNC: 2.2 MG/DL (ref 1.6–2.4)
MCH RBC QN AUTO: 28.6 PG (ref 26.6–33)
MCHC RBC AUTO-ENTMCNC: 32 G/DL (ref 31.5–35.7)
MCV RBC AUTO: 89.3 FL (ref 79–97)
MICROALBUMIN/CREAT UR: NORMAL MG/G{CREAT}
MONOCYTES # BLD AUTO: 0.46 10*3/MM3 (ref 0.1–0.9)
MONOCYTES NFR BLD AUTO: 8.3 % (ref 5–12)
NEUTROPHILS NFR BLD AUTO: 2.86 10*3/MM3 (ref 1.7–7)
NEUTROPHILS NFR BLD AUTO: 51.6 % (ref 42.7–76)
NRBC BLD AUTO-RTO: 0 /100 WBC (ref 0–0.2)
PLATELET # BLD AUTO: 292 10*3/MM3 (ref 140–450)
PMV BLD AUTO: 9.9 FL (ref 6–12)
POTASSIUM SERPL-SCNC: 4.6 MMOL/L (ref 3.5–5.2)
PROT SERPL-MCNC: 7 G/DL (ref 6–8.5)
RBC # BLD AUTO: 4.48 10*6/MM3 (ref 3.77–5.28)
SODIUM SERPL-SCNC: 135 MMOL/L (ref 136–145)
TRIGL SERPL-MCNC: 94 MG/DL (ref 0–150)
TSH SERPL DL<=0.05 MIU/L-ACNC: 1.21 UIU/ML (ref 0.27–4.2)
VLDLC SERPL-MCNC: 17 MG/DL (ref 5–40)
WBC NRBC COR # BLD AUTO: 5.54 10*3/MM3 (ref 3.4–10.8)

## 2024-12-16 PROCEDURE — 84443 ASSAY THYROID STIM HORMONE: CPT | Performed by: NURSE PRACTITIONER

## 2024-12-16 PROCEDURE — 36415 COLL VENOUS BLD VENIPUNCTURE: CPT

## 2024-12-16 PROCEDURE — 82043 UR ALBUMIN QUANTITATIVE: CPT | Performed by: NURSE PRACTITIONER

## 2024-12-16 PROCEDURE — 85025 COMPLETE CBC W/AUTO DIFF WBC: CPT | Performed by: NURSE PRACTITIONER

## 2024-12-16 PROCEDURE — 82570 ASSAY OF URINE CREATININE: CPT | Performed by: NURSE PRACTITIONER

## 2024-12-16 PROCEDURE — 83735 ASSAY OF MAGNESIUM: CPT | Performed by: NURSE PRACTITIONER

## 2024-12-16 PROCEDURE — 80061 LIPID PANEL: CPT | Performed by: NURSE PRACTITIONER

## 2024-12-16 PROCEDURE — 83036 HEMOGLOBIN GLYCOSYLATED A1C: CPT | Performed by: NURSE PRACTITIONER

## 2024-12-16 PROCEDURE — 82306 VITAMIN D 25 HYDROXY: CPT | Performed by: NURSE PRACTITIONER

## 2024-12-16 PROCEDURE — 80053 COMPREHEN METABOLIC PANEL: CPT | Performed by: NURSE PRACTITIONER

## 2024-12-27 DIAGNOSIS — I10 ESSENTIAL HYPERTENSION: ICD-10-CM

## 2024-12-27 RX ORDER — AMLODIPINE BESYLATE 2.5 MG/1
2.5 TABLET ORAL DAILY
Qty: 90 TABLET | Refills: 1 | Status: SHIPPED | OUTPATIENT
Start: 2024-12-27

## 2024-12-27 NOTE — TELEPHONE ENCOUNTER
Rx Refill Note  Requested Prescriptions     Pending Prescriptions Disp Refills    amLODIPine (NORVASC) 2.5 MG tablet [Pharmacy Med Name: AMLODIPINE BESYLATE 2.5MG TABLETS] 90 tablet 1     Sig: Take 1 tablet by mouth Daily.      Last office visit with prescribing clinician: 5/13/2024   Last telemedicine visit with prescribing clinician: Visit date not found   Next office visit with prescribing clinician: Visit date not found                         Would you like a call back once the refill request has been completed: [] Yes [] No    If the office needs to give you a call back, can they leave a voicemail: [] Yes [] No    Cristiane Lu MA  12/27/24, 10:22 EST

## 2024-12-31 ENCOUNTER — TELEPHONE (OUTPATIENT)
Age: 69
End: 2024-12-31
Payer: MEDICARE

## 2024-12-31 NOTE — TELEPHONE ENCOUNTER
I spoke to patient and she is going to try to send a picture through my chart.  She was advised to let us know if she isn't able to do so.

## 2024-12-31 NOTE — TELEPHONE ENCOUNTER
I spoke with the patient and I informed her of Tomeka's recommendation.  The patient did not agree with the providers recommendation.  I informed her that it would be best for her to be seen in the office.  She has an appointment scheduled but will be seeing her PCP next week prior to coming into our office and she will discuss it with them.  I informed her that we will add her to our wait list and call if there are any earlier openings.  Patient verbalized understanding and will call or message with any additional concerns.     [FreeTextEntry1] : Pectus escavatum with dyspnea. Echo shows mildly decrease global left ventricular systolic function with multiple left ventricular regional wal motion abnormalities. PFTs have been poorly performed but there is one PT from outside that suggests his FVC is about 30%. We will obtain chest CT and cardiopulmonary exercise test for further evaluation and discuss with thoracic surgery to determine if there are any options for surgery at this point. On exam, skin normal, no lymphadenopathy. No JVD, Breath sounds midly decreased throughout. Normal S1 and S2. Extremities without cyanosis, clubbing or edema

## 2024-12-31 NOTE — TELEPHONE ENCOUNTER
Provider: AMANDA RUBIO     Caller: FRIEDA GLASS     Relationship to Patient: PATIENT     Pharmacy:   [id number] - [place] - [address] AT [address] OF  &  - [phone] PH - [phone] FX   [address] [place] [address]   Phone: [phone] Fax: [phone]   Hours: Not open 24 hours       Phone Number: [phone]    Reason for Call: PATIENT CALLED TO SCHEDULED FOR NEW PROBLEM - RIGHT FOOT PAIN - DIABETIC , TINY RED HARD SPOT BETWEEN BALL AND ARCH OF RIGHT FOOT - PAINFUL TO WALK . FIRST NOTICED A FEW DAYS AGO AND IT'S GETTING BIGGER     SCHEDULED NEW PROBLEM FIRST AVAILABLE 01/24/2025 - CHECKED WITH FRONT DESKS FOR EARLIER SCHEDULED PER PATIENT'S REQUEST - NO AVAILABILITY - WAS TOLD TO TELL PATIENT TO GO TO URGENT CARE IF SYMPTOMS WORSENED  - ADVISED PATIENT - PATIENT ASK IF MESSAGE COULD BE SENT TO SUSAN OR DR RASMUSSEN - SENDING TE     When was the patient last seen: 09/03/2024 - DR RASMUSSEN, FOLLOW UP SCHEDULED WITH MARIANNA - 03/04/2025.    When did it start: WEEK OF 12/23/2024    Where is it located: BOTTOM OF RIGHT FOOT BETWEEN BALL OF FOOT AND ARCH    Characteristics of symptom/severity: STARTED AS SMALL , RED DOT - HAS BECOME HARD WITH WHITE RING - GROWING LARGER - PAINFUL TO WALK     Timing- Is it constant or intermittent: INTERMITTENT - PAINFUL WHEN PRESSURE IS APPLIED     What makes it worse: PRESSURE APPLIED     What makes it better: NO PRESSURE

## 2025-01-07 NOTE — PROGRESS NOTES
A1c 7%, LDL high.  Recommend Crestor 5 mg p.o. daily.  Check lipid panel and CMP before next follow-up. Clothing

## 2025-01-14 ENCOUNTER — OFFICE (OUTPATIENT)
Dept: URBAN - METROPOLITAN AREA CLINIC 64 | Facility: CLINIC | Age: 70
End: 2025-01-14
Payer: COMMERCIAL

## 2025-01-14 VITALS
HEART RATE: 83 BPM | WEIGHT: 168 LBS | SYSTOLIC BLOOD PRESSURE: 99 MMHG | DIASTOLIC BLOOD PRESSURE: 64 MMHG | HEIGHT: 66 IN

## 2025-01-14 DIAGNOSIS — K59.00 CONSTIPATION, UNSPECIFIED: ICD-10-CM

## 2025-01-14 PROCEDURE — 99213 OFFICE O/P EST LOW 20 MIN: CPT | Performed by: NURSE PRACTITIONER

## 2025-01-16 ENCOUNTER — OFFICE VISIT (OUTPATIENT)
Dept: FAMILY MEDICINE CLINIC | Facility: CLINIC | Age: 70
End: 2025-01-16
Payer: MEDICARE

## 2025-01-16 ENCOUNTER — LAB (OUTPATIENT)
Dept: FAMILY MEDICINE CLINIC | Facility: CLINIC | Age: 70
End: 2025-01-16
Payer: MEDICARE

## 2025-01-16 VITALS
DIASTOLIC BLOOD PRESSURE: 71 MMHG | WEIGHT: 168 LBS | OXYGEN SATURATION: 95 % | SYSTOLIC BLOOD PRESSURE: 103 MMHG | HEART RATE: 71 BPM | TEMPERATURE: 98.7 F | HEIGHT: 62 IN | BODY MASS INDEX: 30.91 KG/M2

## 2025-01-16 DIAGNOSIS — M79.671 RIGHT FOOT PAIN: ICD-10-CM

## 2025-01-16 DIAGNOSIS — E11.65 TYPE 2 DIABETES MELLITUS WITH HYPERGLYCEMIA, WITHOUT LONG-TERM CURRENT USE OF INSULIN: ICD-10-CM

## 2025-01-16 DIAGNOSIS — Z87.19 H/O HERNIA REPAIR: ICD-10-CM

## 2025-01-16 DIAGNOSIS — Z98.890 H/O HERNIA REPAIR: ICD-10-CM

## 2025-01-16 DIAGNOSIS — E11.65 TYPE 2 DIABETES MELLITUS WITH HYPERGLYCEMIA, WITHOUT LONG-TERM CURRENT USE OF INSULIN: Primary | ICD-10-CM

## 2025-01-16 DIAGNOSIS — I10 ESSENTIAL HYPERTENSION: ICD-10-CM

## 2025-01-16 LAB
ALBUMIN SERPL-MCNC: 4.5 G/DL (ref 3.5–5.2)
ALBUMIN/GLOB SERPL: 1.4 G/DL
ALP SERPL-CCNC: 105 U/L (ref 39–117)
ALT SERPL W P-5'-P-CCNC: 11 U/L (ref 1–33)
ANION GAP SERPL CALCULATED.3IONS-SCNC: 12 MMOL/L (ref 5–15)
AST SERPL-CCNC: 15 U/L (ref 1–32)
BASOPHILS # BLD AUTO: 0.05 10*3/MM3 (ref 0–0.2)
BASOPHILS NFR BLD AUTO: 0.6 % (ref 0–1.5)
BILIRUB SERPL-MCNC: 0.2 MG/DL (ref 0–1.2)
BUN SERPL-MCNC: 22 MG/DL (ref 8–23)
BUN/CREAT SERPL: 22.9 (ref 7–25)
CALCIUM SPEC-SCNC: 9.7 MG/DL (ref 8.6–10.5)
CHLORIDE SERPL-SCNC: 104 MMOL/L (ref 98–107)
CHOLEST SERPL-MCNC: 222 MG/DL (ref 0–200)
CO2 SERPL-SCNC: 22 MMOL/L (ref 22–29)
CREAT SERPL-MCNC: 0.96 MG/DL (ref 0.57–1)
DEPRECATED RDW RBC AUTO: 38.8 FL (ref 37–54)
EGFRCR SERPLBLD CKD-EPI 2021: 64.2 ML/MIN/1.73
EOSINOPHIL # BLD AUTO: 0.09 10*3/MM3 (ref 0–0.4)
EOSINOPHIL NFR BLD AUTO: 1.1 % (ref 0.3–6.2)
ERYTHROCYTE [DISTWIDTH] IN BLOOD BY AUTOMATED COUNT: 12.4 % (ref 12.3–15.4)
GLOBULIN UR ELPH-MCNC: 3.3 GM/DL
GLUCOSE SERPL-MCNC: 130 MG/DL (ref 65–99)
HBA1C MFR BLD: 7.5 % (ref 4.8–5.6)
HCT VFR BLD AUTO: 42.1 % (ref 34–46.6)
HDLC SERPL-MCNC: 68 MG/DL (ref 40–60)
HGB BLD-MCNC: 14.1 G/DL (ref 12–15.9)
IMM GRANULOCYTES # BLD AUTO: 0.02 10*3/MM3 (ref 0–0.05)
IMM GRANULOCYTES NFR BLD AUTO: 0.3 % (ref 0–0.5)
LDLC SERPL CALC-MCNC: 136 MG/DL (ref 0–100)
LDLC/HDLC SERPL: 1.96 {RATIO}
LYMPHOCYTES # BLD AUTO: 2.27 10*3/MM3 (ref 0.7–3.1)
LYMPHOCYTES NFR BLD AUTO: 28.4 % (ref 19.6–45.3)
MCH RBC QN AUTO: 29.1 PG (ref 26.6–33)
MCHC RBC AUTO-ENTMCNC: 33.5 G/DL (ref 31.5–35.7)
MCV RBC AUTO: 86.8 FL (ref 79–97)
MONOCYTES # BLD AUTO: 0.54 10*3/MM3 (ref 0.1–0.9)
MONOCYTES NFR BLD AUTO: 6.8 % (ref 5–12)
NEUTROPHILS NFR BLD AUTO: 5.03 10*3/MM3 (ref 1.7–7)
NEUTROPHILS NFR BLD AUTO: 62.8 % (ref 42.7–76)
NRBC BLD AUTO-RTO: 0 /100 WBC (ref 0–0.2)
PLATELET # BLD AUTO: 349 10*3/MM3 (ref 140–450)
PMV BLD AUTO: 10.4 FL (ref 6–12)
POTASSIUM SERPL-SCNC: 5 MMOL/L (ref 3.5–5.2)
PROT SERPL-MCNC: 7.8 G/DL (ref 6–8.5)
RBC # BLD AUTO: 4.85 10*6/MM3 (ref 3.77–5.28)
SODIUM SERPL-SCNC: 138 MMOL/L (ref 136–145)
TRIGL SERPL-MCNC: 104 MG/DL (ref 0–150)
TSH SERPL DL<=0.05 MIU/L-ACNC: 0.93 UIU/ML (ref 0.27–4.2)
VLDLC SERPL-MCNC: 18 MG/DL (ref 5–40)
WBC NRBC COR # BLD AUTO: 8 10*3/MM3 (ref 3.4–10.8)

## 2025-01-16 PROCEDURE — 83036 HEMOGLOBIN GLYCOSYLATED A1C: CPT | Performed by: NURSE PRACTITIONER

## 2025-01-16 PROCEDURE — 85025 COMPLETE CBC W/AUTO DIFF WBC: CPT | Performed by: NURSE PRACTITIONER

## 2025-01-16 PROCEDURE — 3074F SYST BP LT 130 MM HG: CPT | Performed by: NURSE PRACTITIONER

## 2025-01-16 PROCEDURE — 99214 OFFICE O/P EST MOD 30 MIN: CPT | Performed by: NURSE PRACTITIONER

## 2025-01-16 PROCEDURE — 84443 ASSAY THYROID STIM HORMONE: CPT | Performed by: NURSE PRACTITIONER

## 2025-01-16 PROCEDURE — 1160F RVW MEDS BY RX/DR IN RCRD: CPT | Performed by: NURSE PRACTITIONER

## 2025-01-16 PROCEDURE — 80061 LIPID PANEL: CPT | Performed by: NURSE PRACTITIONER

## 2025-01-16 PROCEDURE — 1159F MED LIST DOCD IN RCRD: CPT | Performed by: NURSE PRACTITIONER

## 2025-01-16 PROCEDURE — 80053 COMPREHEN METABOLIC PANEL: CPT | Performed by: NURSE PRACTITIONER

## 2025-01-16 PROCEDURE — 3078F DIAST BP <80 MM HG: CPT | Performed by: NURSE PRACTITIONER

## 2025-01-16 PROCEDURE — 36415 COLL VENOUS BLD VENIPUNCTURE: CPT | Performed by: NURSE PRACTITIONER

## 2025-01-16 NOTE — PROGRESS NOTES
Subjective   Sneha Vega is a 69 y.o. female presents for   Chief Complaint   Patient presents with    Hospital Follow Up Visit     Hernia surgery    Diabetes     3 month follow up       Health Maintenance Due   Topic Date Due    HEPATITIS C SCREENING  Never done    DIABETIC EYE EXAM  12/05/2024    ANNUAL WELLNESS VISIT  04/04/2025       History of Present Illness  The patient is a 69-year-old female who presents for follow-up from hernia surgery and diabetes follow-up.    She underwent hernia surgery on 12/18/2024. She has been experiencing constipation, which has worsened post-surgery. She is currently taking MiraLAX three times daily to manage this issue. She has a history of constipation following her last two surgeries and is under the care of a gastroenterologist for this problem. She has expressed concerns about her iron levels, which she believes to be borderline or slightly low. She has not been taking iron supplements due to their constipating effects. She has requested a list of iron-rich foods to incorporate into her diet.     A few weeks ago, she noticed a small, red, sore spot on her foot during a self-examination. The redness subsided, but a painful lump developed underneath the skin, resembling a BB pellet. This lump, located in a crevice, was so painful that it affected her walking. She scheduled an appointment with her nurse practitioner for 01/24/2025. Currently, the lump has flattened slightly, is no longer sore, but appears blue. She has not received any treatment for this condition. She has not applied ice to the area. She wears supportive slippers at home and previously used inserts in her socks for additional support. She also reports knee problems and receives injections for this issue.    She is diabetic and is followed by Dr. Alanis. She reports that her blood sugars tend to drop significantly during surgery, necessitating the temporary discontinuation of her medication. She recalls an  "instance where her blood sugar was so low that it did not register on her machine. She resumed her medication after noticing an increase in her blood sugar levels. She monitors her blood sugar every two hours at night. She is considering getting a tattoo and is seeking advice on the best location for it. She reports that her blood sugar levels have not returned to their pre-surgery levels, with morning readings in the high 130s to low 140s. She is on a restricted diet, avoiding bread and pasta, and occasionally consumes Fairlife drinks containing 30 g of protein and 4 g of carbs. She also enjoys Greek yogurt.    MEDICATIONS  MiraLAX    Vitals:    01/16/25 1105   BP: 103/71   BP Location: Right arm   Patient Position: Sitting   Cuff Size: Large Adult   Pulse: 71   Temp: 98.7 °F (37.1 °C)   TempSrc: Tympanic   SpO2: 95%   Weight: 76.2 kg (168 lb)   Height: 157.5 cm (62.01\")     Body mass index is 30.72 kg/m².    Current Outpatient Medications on File Prior to Visit   Medication Sig Dispense Refill    amLODIPine (NORVASC) 2.5 MG tablet TAKE 1 TABLET BY MOUTH DAILY 90 tablet 1    Blood Glucose Monitoring Suppl (FreeStyle Lite) w/Device kit Use 1 Device Daily. Used to check blood glucose prn. 1 kit 0    Calcium-Magnesium-Vitamin D (CALCIUM MAGNESIUM PO) 0      Cholecalciferol (Vitamin D3) 50 MCG (2000 UT) capsule Take 1 capsule by mouth Daily.      empagliflozin (Jardiance) 10 MG tablet tablet Take 1 tablet by mouth Daily. 90 tablet 3    fluorouracil (EFUDEX) 5 % cream Apply 1 Application topically to the appropriate area as directed Daily.      FREESTYLE LITE test strip Used to test blood sugars at least 1 time per day. 100 each 4    Lancets (freestyle) lancets Used to test blood sugars at least 1 time per day. 100 each 3    lisinopril (PRINIVIL,ZESTRIL) 40 MG tablet Take 1 tablet by mouth Daily. 90 tablet 3    Omega-3 Fatty Acids (fish oil) 1000 MG capsule capsule Take 1 capsule by mouth Daily.      Probiotic Product " (PROBIOTIC PO) 1 cap po qd      psyllium (METAMUCIL) 0.52 g capsule 0      aluminum hydroxide-magnesium carbonate (GAVISCON)  MG/15ML suspension oral suspension Take 15 mL by mouth 3 (Three) Times a Day With Meals.       No current facility-administered medications on file prior to visit.       The following portions of the patient's history were reviewed and updated as appropriate: allergies, current medications, past family history, past medical history, past social history, past surgical history, and problem list.    Review of Systems   Constitutional:  Positive for fatigue. Negative for chills, diaphoresis and fever.   HENT:  Negative for congestion, sore throat and swollen glands.    Respiratory:  Negative for cough.    Cardiovascular:  Negative for chest pain.   Gastrointestinal:  Negative for abdominal pain, nausea and vomiting.   Genitourinary:  Negative for dysuria.   Musculoskeletal:  Negative for myalgias and neck pain.   Skin:  Negative for rash.   Neurological:  Negative for weakness and numbness.       Objective   Physical Exam  Vitals and nursing note reviewed.   Constitutional:       Appearance: Normal appearance. She is well-developed.   HENT:      Head: Normocephalic and atraumatic.      Right Ear: External ear normal.      Left Ear: External ear normal.      Nose: Nose normal.   Eyes:      Extraocular Movements: Extraocular movements intact.      Pupils: Pupils are equal, round, and reactive to light.   Cardiovascular:      Rate and Rhythm: Normal rate and regular rhythm.      Pulses:           Dorsalis pedis pulses are 2+ on the left side.        Posterior tibial pulses are 2+ on the left side.      Heart sounds: Normal heart sounds.   Pulmonary:      Effort: Pulmonary effort is normal.      Breath sounds: Normal breath sounds.   Abdominal:      General: Bowel sounds are normal.      Palpations: Abdomen is soft.   Genitourinary:     Vagina: Normal.   Musculoskeletal:         General: Normal  range of motion.      Cervical back: Normal range of motion and neck supple.        Feet:    Feet:      Left foot:      Skin integrity: Skin integrity normal.   Skin:     General: Skin is warm and dry.   Neurological:      General: No focal deficit present.      Mental Status: She is alert and oriented to person, place, and time.   Psychiatric:         Mood and Affect: Mood normal.         Behavior: Behavior normal.         Judgment: Judgment normal.              PHQ-9 Total Score:      Results  Laboratory Studies  Hemoglobin and hematocrit were both a little bit low. Hemoglobin was 11 two days after surgery, prior to that it was 12.3. In 12/16/2024, hemoglobin was 12.8 and in May 2024, it was 11.8. A1c had gotten a little bit better when checked before Christmas.    Assessment & Plan   Diagnoses and all orders for this visit:    1. Type 2 diabetes mellitus with hyperglycemia, without long-term current use of insulin (Primary)  -     CBC Auto Differential; Future  -     Comprehensive Metabolic Panel; Future  -     Hemoglobin A1c; Future  -     Lipid Panel; Future  -     TSH; Future    2. Essential hypertension  -     CBC w AUTO Differential    3. Right foot pain  -     XR Foot 3+ View Right; Future    4. H/O hernia repair  Comments:  healing well, follow up with surgeon as directed         1. Post-surgical follow-up.  Her hemoglobin levels have decreased from 12.3 to 11, likely due to blood loss during surgery. Previous CBCs were within normal range, except for a reading of 11.8 in May 2024. Her carbon dioxide levels are low, possibly due to shallow breathing during blood draw. She is advised to focus on iron-rich foods rather than iron supplementation to avoid gastrointestinal issues. She is also encouraged to improve her breathing techniques. A recheck of her CBC will be conducted today to assess if her hemoglobin levels are rebounding appropriately. A list of iron-rich foods and protein amounts in different foods  has been provided to assist in dietary choices.    2. Foot pain.  The pain could be due to a strained plantar fascia or a cyst. She is advised to apply intermittent ice if the area becomes tender and to wear supportive shoes, even at home. An x-ray has been ordered to evaluate. She is advised to keep her appointment with the nurse practitioner on 01/24/2024.    3. Diabetes mellitus.  Her A1c levels have improved since the last check before Christmas. She is advised to avoid getting a tattoo on her lower extremities due to reduced circulation. She is also advised to ensure adequate protein intake and to monitor for signs and symptoms of infection. She may consider using an antibiotic ointment like bacitracin for tattoo aftercare, but should confirm with the . She will continue her regular follow-up with Dr. Alanis in April 2025.    Follow-up  The patient will follow up in 6 months.    PROCEDURE  The patient underwent hernia surgery on 12/18/2024.    There are no Patient Instructions on file for this visit.         Patient or patient representative verbalized consent for the use of Ambient Listening during the visit with  JOANNE Meehan for chart documentation. 1/16/2025  15:55 EST  Answers submitted by the patient for this visit:  Primary Reason for Visit (Submitted on 1/11/2025)  What is the primary reason for your visit?: Problem Not Listed  Problem not listed (Submitted on 1/11/2025)  Chief Complaint: Other medical problem  Reason for appointment: Follow up after surgery  anorexia: No  joint pain: No  change in stool: Yes  headaches: No  joint swelling: No  vertigo: No  visual change: No  Onset: 1 to 6 months  Chronicity: new  Additional information: Recent surgery

## 2025-03-04 ENCOUNTER — OFFICE VISIT (OUTPATIENT)
Age: 70
End: 2025-03-04
Payer: MEDICARE

## 2025-03-04 VITALS — HEIGHT: 62 IN | HEART RATE: 83 BPM | OXYGEN SATURATION: 96 % | WEIGHT: 166.4 LBS | BODY MASS INDEX: 30.62 KG/M2

## 2025-03-04 DIAGNOSIS — M79.671 PAIN IN RIGHT FOOT: ICD-10-CM

## 2025-03-04 DIAGNOSIS — M72.2 PLANTAR FASCIAL FIBROMATOSIS OF RIGHT FOOT: ICD-10-CM

## 2025-03-04 DIAGNOSIS — L85.2 PUNCTATE KERATOSIS: ICD-10-CM

## 2025-03-04 DIAGNOSIS — E11.65 TYPE 2 DIABETES MELLITUS WITH HYPERGLYCEMIA, WITHOUT LONG-TERM CURRENT USE OF INSULIN: Primary | ICD-10-CM

## 2025-03-04 DIAGNOSIS — M21.861 ACQUIRED POSTERIOR EQUINUS, RIGHT: ICD-10-CM

## 2025-03-07 NOTE — PROGRESS NOTES
03/04/2025  Foot and Ankle Surgery - Established Patient/Follow-up  Provider: JOANNE Tony   Location: [facility]    Subjective:  Sneha Vega is a 69 y.o. female.     Chief Complaint   Patient presents with    Left Foot - Follow-up, Nail Problem    Right Foot - Follow-up, Nail Problem, Callouses     [place] in the middle of the bottom of her foot    Follow-Up     Sara Feliz, APRN  1/16/25       History of Present Illness  The patient is a 69-year-old female who presents for a routine diabetic foot check and follow-up of right plantar foot pain.    She reports that the pain and nodule in her right plantar arch  has almost completely subsided. Initially, she noticed a small, bluish, BB-sized lesion on the surface of her foot, which was associated with significant pain. The lesion subsequently disappeared, leaving a remnant that is no longer painful upon palpation. She recalls the onset of the lesion as a small red dot with a slightly bumpy surface, which later became subcutaneous. The lesion did not cause itching but was extremely painful during ambulation, leading to a noticeable limp. She also mentions a blue cast to the lesion, which she found unusual.    She reports a similar lesion on right plantar foot, which causes pain during showers. She uses a file on her heels and toes, noting that the skin tends to become rough. She typically wears inserts without metatarsal pads and experiences discomfort when barefoot in the shower. She finds relief from the pain when seated. She spends most of her time with her feet covered, except for bathroom visits at night. She recently had her toenails trimmed and reports previous pain in both big toenails, which has since resolved. She does not experience any burning or tingling sensations in her feet. She wears a size 9.5 shoe.    Her blood sugar levels are currently stable. Prior to her surgery, her A1c was 7.1. Post-surgery, she experienced  "hypoglycemia, necessitating the discontinuation of her diabetic medications, which subsequently led to hyperglycemia. Her A1c then increased to 7.6. She has undergone two surgeries in the past year, both times experiencing hypoglycemia postoperatively. Despite significant weight loss since May 2024, her blood sugar levels have not improved.       Allergies   Allergen Reactions    Acyclovir Hives and Rash    Pepcid [Famotidine] GI Intolerance    Turmeric GI Intolerance       Current Outpatient Medications on File Prior to Visit   Medication Sig Dispense Refill    amLODIPine (NORVASC) 2.5 MG tablet TAKE 1 TABLET BY MOUTH DAILY 90 tablet 1    Blood Glucose Monitoring Suppl (FreeStyle Lite) w/Device kit Use 1 Device Daily. Used to check blood glucose prn. 1 kit 0    Calcium-Magnesium-Vitamin D (CALCIUM MAGNESIUM PO) 0      Cholecalciferol (Vitamin D3) 50 MCG (2000 UT) capsule Take 1 capsule by mouth Daily.      empagliflozin (Jardiance) 10 MG tablet tablet Take 1 tablet by mouth Daily. 90 tablet 3    fluorouracil (EFUDEX) 5 % cream Apply 1 Application topically to the appropriate area as directed Daily.      FREESTYLE LITE test strip Used to test blood sugars at least 1 time per day. 100 each 4    Lancets (freestyle) lancets Used to test blood sugars at least 1 time per day. 100 each 3    lisinopril (PRINIVIL,ZESTRIL) 40 MG tablet Take 1 tablet by mouth Daily. 90 tablet 3    Omega-3 Fatty Acids (fish oil) 1000 MG capsule capsule Take 1 capsule by mouth Daily.      Probiotic Product (PROBIOTIC PO) 1 cap po qd      psyllium (METAMUCIL) 0.52 g capsule 0       No current facility-administered medications on file prior to visit.       Objective   Pulse 83   Ht 157.5 cm (62\")   Wt 75.5 kg (166 lb 6.4 oz)   SpO2 96%   Breastfeeding No   BMI 30.43 kg/m²     Foot/Ankle Exam    GENERAL  Diabetic foot exam performed    Appearance:  appears stated age  Orientation:  AAOx3  Affect:  appropriate  Gait:  unimpaired  Assistance:  " independent  Right shoe gear: casual shoe and sock  Left shoe gear: casual shoe and sock    VASCULAR     Right Foot Vascularity   Dorsalis pedis:  2+  Skin temperature:  warm  Edema grading:  None  CFT:  < 3 seconds  Pedal hair growth:  Present  Varicosities:  none     Left Foot Vascularity   Dorsalis pedis:  2+  Skin temperature:  warm  Edema grading:  None  CFT:  < 3 seconds  Pedal hair growth:  Present  Varicosities:  none     NEUROLOGIC     Right Foot Neurologic   Light touch sensation: normal  Protective Sensation using [place] Monofilament:   Sites intact: 10  Sites tested: 10     Left Foot Neurologic   Light touch sensation: normal  Protective Sensation using [place] Monofilament:   Sites tested: 10    MUSCULOSKELETAL     Right Foot Musculoskeletal   Ecchymosis:  none  Tenderness:  callous right foot       Left Foot Musculoskeletal   Ecchymosis:  none  Tenderness:  none    DERMATOLOGIC      Right Foot Dermatologic   Skin  Positive for skin changes.   Nails  1.  Normal.  2.  Normal.  3.  Normal.  4.  Normal.  5.  Normal.     Left Foot Dermatologic   Skin  Positive for skin changes.   Nails  1.  Normal.  2.  Normal.  3.  Normal.  4.  Normal.  5.  Normal.     Right foot additional comments: Tiny nodule felt in arch area consistent with plantar fibroma. Measuring approx 0.1x0.1cm    Physical Exam  There is a punctate keratosis over the fifth metatarsal head of the right foot.    Specialty Testing  Sensation test shows 10 out of 10 on both feet.       Results       Assessment & Plan   Diagnoses and all orders for this visit:    1. Type 2 diabetes mellitus with hyperglycemia, without long-term current use of insulin (Primary)    2. Pain in right foot    3. Punctate keratosis    4. Acquired posterior equinus, right    5. Plantar fascial fibromatosis of right foot      Assessment & Plan  1. Plantar fibroma.  The condition appears to be improving, with the patient reporting that the pain has subsided  and only a remnant remains. It was discussed that plantar fibromas can resolve on their own or may require further intervention if they become symptomatic. The patient was advised to monitor the condition and consider a steroid injection or surgical removal if it becomes problematic.    2. Punctate keratosis.  The patient has a pinpoint callus over the fifth metatarsal head, likely due to pressure and friction. She was advised to avoid walking barefoot and to use supportive footwear with arch support and a metatarsal pad. Calf stretching exercises were recommended to help redistribute pressure on the foot. A new pair of insoles will be provided, and the old ones can be used in other shoes. The patient was also advised to continue exfoliating and moisturizing the feet. It was removed today with curette x 1.     3. Diabetes mellitus.  The patient's blood sugar levels have been fluctuating, with a recent A1c of 7.6%. She reported difficulties managing blood sugar levels post-surgery, which required temporary cessation of diabetic medications. She was advised to continue monitoring blood sugar levels and to follow up with her primary care provider for further management. She is at a mild risk for pedal complication related to diabetes.    Explained importance of diabetic foot care, daily foot checks, and glycemic control. Patient should check both feet on a daily basis, monitor and control blood sugars, make sure that both feet and in between toes are towel dried after baths or showers. Avoid barefoot walking at all times.  I discussed wearing white socks and checking shoes before wearing them. Patient was given information on proper foot care. Call the office at the first signs of a wound or with signs of infection.     Follow-up  The patient will follow up in 1 year.    PROCEDURE  The patient underwent Nissen fundoplication surgery twice in the past year.       No orders of the defined types were placed in this  encounter.         Patient or patient representative verbalized consent for the use of Ambient Listening during the visit with  JOANNE Smart for chart documentation. 3/7/2025  09:39 EST

## 2025-03-30 DIAGNOSIS — I10 ESSENTIAL HYPERTENSION: ICD-10-CM

## 2025-03-31 RX ORDER — AMLODIPINE BESYLATE 2.5 MG/1
2.5 TABLET ORAL DAILY
Qty: 90 TABLET | Refills: 1 | Status: SHIPPED | OUTPATIENT
Start: 2025-03-31

## 2025-04-10 ENCOUNTER — LAB (OUTPATIENT)
Dept: ENDOCRINOLOGY | Facility: CLINIC | Age: 70
End: 2025-04-10
Payer: MEDICARE

## 2025-04-10 DIAGNOSIS — E11.65 TYPE 2 DIABETES MELLITUS WITH HYPERGLYCEMIA, WITHOUT LONG-TERM CURRENT USE OF INSULIN: ICD-10-CM

## 2025-04-10 DIAGNOSIS — Z12.31 ENCOUNTER FOR SCREENING MAMMOGRAM FOR MALIGNANT NEOPLASM OF BREAST: Primary | ICD-10-CM

## 2025-04-11 LAB
ALBUMIN SERPL-MCNC: 4 G/DL (ref 3.9–4.9)
ALBUMIN/CREAT UR: 6 MG/G CREAT (ref 0–29)
ALP SERPL-CCNC: 95 IU/L (ref 44–121)
ALT SERPL-CCNC: 11 IU/L (ref 0–32)
AST SERPL-CCNC: 18 IU/L (ref 0–40)
BILIRUB SERPL-MCNC: 0.2 MG/DL (ref 0–1.2)
BUN SERPL-MCNC: 16 MG/DL (ref 8–27)
BUN/CREAT SERPL: 18 (ref 12–28)
CALCIUM SERPL-MCNC: 9.1 MG/DL (ref 8.7–10.3)
CHLORIDE SERPL-SCNC: 99 MMOL/L (ref 96–106)
CHOLEST SERPL-MCNC: 190 MG/DL (ref 100–199)
CO2 SERPL-SCNC: 20 MMOL/L (ref 20–29)
CREAT SERPL-MCNC: 0.87 MG/DL (ref 0.57–1)
CREAT UR-MCNC: 56 MG/DL
EGFRCR SERPLBLD CKD-EPI 2021: 72 ML/MIN/1.73
GLOBULIN SER CALC-MCNC: 2.7 G/DL (ref 1.5–4.5)
GLUCOSE SERPL-MCNC: 151 MG/DL (ref 70–99)
HBA1C MFR BLD: 8.1 % (ref 4.8–5.6)
HDLC SERPL-MCNC: 68 MG/DL
LDLC SERPL CALC-MCNC: 105 MG/DL (ref 0–99)
MICROALBUMIN UR-MCNC: 3.1 UG/ML
POTASSIUM SERPL-SCNC: 4.5 MMOL/L (ref 3.5–5.2)
PROT SERPL-MCNC: 6.7 G/DL (ref 6–8.5)
SODIUM SERPL-SCNC: 134 MMOL/L (ref 134–144)
TRIGL SERPL-MCNC: 96 MG/DL (ref 0–149)
VLDLC SERPL CALC-MCNC: 17 MG/DL (ref 5–40)

## 2025-04-15 ENCOUNTER — TRANSCRIBE ORDERS (OUTPATIENT)
Dept: ADMINISTRATIVE | Facility: HOSPITAL | Age: 70
End: 2025-04-15
Payer: MEDICARE

## 2025-04-15 ENCOUNTER — OFFICE (OUTPATIENT)
Dept: URBAN - METROPOLITAN AREA CLINIC 64 | Facility: CLINIC | Age: 70
End: 2025-04-15
Payer: MEDICARE

## 2025-04-15 VITALS
SYSTOLIC BLOOD PRESSURE: 121 MMHG | HEART RATE: 69 BPM | WEIGHT: 162 LBS | HEIGHT: 66 IN | DIASTOLIC BLOOD PRESSURE: 71 MMHG

## 2025-04-15 DIAGNOSIS — K59.00 CONSTIPATION, UNSPECIFIED: ICD-10-CM

## 2025-04-15 PROCEDURE — 99212 OFFICE O/P EST SF 10 MIN: CPT | Performed by: NURSE PRACTITIONER

## 2025-04-17 ENCOUNTER — OFFICE VISIT (OUTPATIENT)
Dept: ENDOCRINOLOGY | Facility: CLINIC | Age: 70
End: 2025-04-17
Payer: MEDICARE

## 2025-04-17 VITALS
HEIGHT: 62 IN | OXYGEN SATURATION: 99 % | DIASTOLIC BLOOD PRESSURE: 70 MMHG | HEART RATE: 71 BPM | WEIGHT: 160 LBS | BODY MASS INDEX: 29.44 KG/M2 | SYSTOLIC BLOOD PRESSURE: 102 MMHG

## 2025-04-17 DIAGNOSIS — E11.65 TYPE 2 DIABETES MELLITUS WITH HYPERGLYCEMIA, WITHOUT LONG-TERM CURRENT USE OF INSULIN: Primary | ICD-10-CM

## 2025-04-17 DIAGNOSIS — E78.2 MIXED HYPERLIPIDEMIA: ICD-10-CM

## 2025-04-17 DIAGNOSIS — I10 ESSENTIAL HYPERTENSION: ICD-10-CM

## 2025-04-17 RX ORDER — DOCUSATE SODIUM 100 MG/1
100 CAPSULE, LIQUID FILLED ORAL 2 TIMES DAILY
COMMUNITY

## 2025-04-17 NOTE — PROGRESS NOTES
Endocrine Progress Note Outpatient     Patient Care Team:  Sara Feliz APRN as PCP - General (Nurse Practitioner)  Prema Snow MD as Consulting Physician (Endocrinology)  Melanie Gibson MD as Consulting Physician (Otolaryngology)  Stew Campbell MD as Surgeon (General Surgery)  Blake Martinez as Consulting Physician (Gastroenterology)  Jimbo Roman II, MD as Consulting Physician (Orthopedic Surgery)  Idalia Baxter MD (Dermatology)  [facility] (Optometry)  Dr. Eric Garcia (Optometry)  Shari Daniel APRN as Nurse Practitioner (Orthopedic Surgery)    Chief Complaint: Uncontrolled diabetes    HPI: This is a 70-year-old female with history of type 2 diabetes, hypertension and hyperlipidemia is here for follow-up.    For type 2 diabetes: Initial diagnosis was in October 2020.  She is currently on Jardiance 10 mg once a day.  So far she is tolerating her Jardiance well.  She underwent hernia surgeries in December 2024 and postop she was not doing very well not able to eat has lost about 13 to 16 pounds.  Last few weeks she is beginning to feel better.  Her blood sugars that she brought for the last few weeks running less than 150 most of the time.  She is tolerating Jardiance well.  She was off Jardiance for some time about 2 weeks or so during the surgery timeframe.    Hypertension: Well-controlled    Hyperlipidemia: She is not taking any lipid-lowering agents at this time.    Past Medical History:   Diagnosis Date    Arthritis     Callus     Cancer 1999    L mastectomy    Colitis     Dysphagia     Heart murmur     History of medical problems     Hyperlipidemia     Hypertension     Infectious viral hepatitis 1962    Hepatitis A    Type 2 diabetes mellitus with hyperglycemia, without long-term current use of insulin 11/27/2023       Social History     Socioeconomic History    Marital status: Other    Number of children: 1    Years of education: 12    Highest education level:  Bachelor's degree (e.g., BA, AB, BS)   Tobacco Use    Smoking status: Former     Current packs/day: 0.00     Average packs/day: 1 pack/day for 20.0 years (20.0 ttl pk-yrs)     Types: Cigarettes     Start date: 1974     Quit date: 3/4/2001     Years since quittin.1     Passive exposure: Past    Smokeless tobacco: Never   Vaping Use    Vaping status: Never Used   Substance and Sexual Activity    Alcohol use: Not Currently    Drug use: Never    Sexual activity: Not Currently     Partners: Male       Family History   Problem Relation Age of Onset    Hypertension Mother     Heart failure Father     Stroke Father     Hyperlipidemia Father     Cancer Sister         skin    Heart murmur Sister     No Known Problems Brother     Diabetes Maternal Grandfather        Allergies   Allergen Reactions    Acyclovir Hives and Rash    Pepcid [Famotidine] GI Intolerance    Turmeric GI Intolerance       ROS:   Constitutional:  Denies fatigue, tiredness.    Eyes:  Denies change in visual acuity   HENT:  Denies nasal congestion or sore throat   Respiratory: denies cough, shortness of breath.   Cardiovascular:  denies chest pain, edema   GI:  Denies abdominal pain, nausea, vomiting.   Musculoskeletal:  Denies back pain or joint pain   Integument:  Denies dry skin and rash   Neurologic:  Denies headache, focal weakness or sensory changes   Endocrine:  Denies polyuria or polydipsia   Psychiatric:  Denies depression or anxiety      Vitals:    25 1124   BP: 102/70   Pulse: 71   SpO2: 99%     Body mass index is 29.26 kg/m².     Physical Exam:  GEN: NAD, conversant  EYES: EOMI, PERRL,  NECK: no thyromegaly,   CV: RRR  LUNG: CTA  NEURO: no tremors, DTR normal  PSYCH: Awake and coherent      Results Review:     I reviewed the patient's new clinical results.    Lab Results   Component Value Date    HGBA1C 8.1 (H) 04/10/2025    HGBA1C 7.50 (H) 2025    HGBA1C 7.10 (H) 2024      Lab Results   Component Value Date    GLUCOSE  151 (H) 04/10/2025    BUN 16 04/10/2025    CREATININE 0.87 04/10/2025    EGFRIFAFRI >60 11/02/2022    BCR 18 04/10/2025    K 4.5 04/10/2025    CO2 20 04/10/2025    CALCIUM 9.1 04/10/2025    ALBUMIN 4.0 04/10/2025    AST 18 04/10/2025    ALT 11 04/10/2025    CHOL 222 (H) 01/16/2025    TRIG 96 04/10/2025     (H) 04/10/2025    HDL 68 04/10/2025       Medication Review: Reviewed.       Current Outpatient Medications:     amLODIPine (NORVASC) 2.5 MG tablet, TAKE 1 TABLET BY MOUTH DAILY, Disp: 90 tablet, Rfl: 1    Blood Glucose Monitoring Suppl (FreeStyle Lite) w/Device kit, Use 1 Device Daily. Used to check blood glucose prn., Disp: 1 kit, Rfl: 0    Cholecalciferol (Vitamin D3) 50 MCG (2000 UT) capsule, Take 1 capsule by mouth Daily., Disp: , Rfl:     docusate sodium (COLACE) 100 MG capsule, Take 1 capsule by mouth 2 (Two) Times a Day., Disp: , Rfl:     empagliflozin (Jardiance) 10 MG tablet tablet, Take 1 tablet by mouth Daily., Disp: 90 tablet, Rfl: 3    FREESTYLE LITE test strip, Used to test blood sugars at least 1 time per day., Disp: 100 each, Rfl: 4    Lancets (freestyle) lancets, Used to test blood sugars at least 1 time per day., Disp: 100 each, Rfl: 3    lisinopril (PRINIVIL,ZESTRIL) 40 MG tablet, Take 1 tablet by mouth Daily., Disp: 90 tablet, Rfl: 3    Probiotic Product (PROBIOTIC PO), 1 cap po qd, Disp: , Rfl:     psyllium (METAMUCIL) 0.52 g capsule, 0, Disp: , Rfl:     Calcium-Magnesium-Vitamin D (CALCIUM MAGNESIUM PO), 0, Disp: , Rfl:     fluorouracil (EFUDEX) 5 % cream, Apply 1 Application topically to the appropriate area as directed Daily., Disp: , Rfl:     Omega-3 Fatty Acids (fish oil) 1000 MG capsule capsule, Take 1 capsule by mouth Daily. (Patient not taking: Reported on 4/17/2025), Disp: , Rfl:     Assessment and plan:  Diabetes mellitus type 2 with hyperglycemia: Uncontrolled with A1c at 8.1% but she went through surgery and some postop distress.  She is now getting back to baseline the  blood sugars are improving.  Will continue Jardiance at 10 mg p.o. daily.  Recommend to continue to work on diet and activity and will follow blood sugars and A1c.    Hypertension: Well-controlled    Hyperlipidemia: Uncontrolled with high LDL, recommend statins.  She is not interested in taking any lipid-lowering medications at this time.  She will be working on her diet and activity.  She does understand that high LDL can increase the risk for heart disease and stroke.      Assessment and plan from September 16, 2024 reviewed and updated.       Prema Snow MD FACE.

## 2025-05-08 ENCOUNTER — OFFICE VISIT (OUTPATIENT)
Dept: FAMILY MEDICINE CLINIC | Facility: CLINIC | Age: 70
End: 2025-05-08
Payer: MEDICARE

## 2025-05-08 VITALS
HEIGHT: 62 IN | DIASTOLIC BLOOD PRESSURE: 77 MMHG | BODY MASS INDEX: 29.44 KG/M2 | TEMPERATURE: 98.4 F | HEART RATE: 72 BPM | WEIGHT: 160 LBS | OXYGEN SATURATION: 98 % | SYSTOLIC BLOOD PRESSURE: 113 MMHG
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
## 2025-05-08 DIAGNOSIS — S30.861A TICK BITE OF ABDOMINAL WALL, INITIAL ENCOUNTER: Primary | ICD-10-CM

## 2025-05-08 DIAGNOSIS — Z12.31 ENCOUNTER FOR SCREENING MAMMOGRAM FOR MALIGNANT NEOPLASM OF BREAST: ICD-10-CM

## 2025-05-08 RX ORDER — DOXYCYCLINE 100 MG/1
200 CAPSULE ORAL DAILY
Qty: 2 CAPSULE | Refills: 0 | Status: SHIPPED | OUTPATIENT
Start: 2025-05-08

## 2025-05-08 NOTE — PROGRESS NOTES
Subjective   Sneha Vega is a 70 y.o. adult presents for   Chief Complaint   Patient presents with    Tick Removal     She removed it from her belly button yesterday.Tick was not engorged. She is very itchy under her skin and red and thinks the head is still in there       Health Maintenance Due   Topic Date Due    TDAP/TD VACCINES (1 - Tdap) Never done    HEPATITIS C SCREENING  Never done    DIABETIC EYE EXAM  03/18/2025    ANNUAL WELLNESS VISIT  04/04/2025       History of Present Illness  The patient is a 70-year-old female who presents today for a tick bite that she removed from her bellybutton on 05/07/2025. She reports the tick was not engorged, but the area where the tick was attached is very itchy and red. She suspects that the head of the tick may still be embedded in her skin.    She believes the tick was present for approximately one day, as it was not engorged. She describes a lack of superficial sensation in the area, which she attributes to her inability to detect the tick's presence. However, she reports deep-seated sensations that were not present prior to the tick bite, leading her to suspect that the tick may have injected a substance causing irritation. The area is described as itchy and red, with a noticeable bump upon palpation. She also notes a small spot in the vicinity of the tick bite, the origin of which is unknown to her.    She expresses concern about potential side effects of doxycycline, given her history of hiatal hernia surgery and associated vomiting. She also mentions difficulty swallowing large objects, a condition that predates her hiatal hernia surgery and distant fundoplication.     She is due for an eye exam and has one scheduled in 06/2025. A mammogram was ordered for her at her last visit, but it was accidentally ordered as bilateral. She only needs a right mammogram because she has had a left mastectomy. She informed the  about this, and they said they would  "contact the office to get the correct order, but she has not heard back from them.    PAST SURGICAL HISTORY:  - Hiatal hernia surgery  - Distant fundoplication  - Left mastectomy       Vitals:    05/08/25 1432   BP: 113/77   BP Location: Right arm   Patient Position: Sitting   Cuff Size: Adult   Pulse: 72   Temp: 98.4 °F (36.9 °C)   TempSrc: Tympanic   SpO2: 98%   Weight: 72.6 kg (160 lb)   Height: 157.5 cm (62.01\")     Body mass index is 29.26 kg/m².    Current Outpatient Medications on File Prior to Visit   Medication Sig Dispense Refill    amLODIPine (NORVASC) 2.5 MG tablet TAKE 1 TABLET BY MOUTH DAILY 90 tablet 1    Blood Glucose Monitoring Suppl (FreeStyle Lite) w/Device kit Use 1 Device Daily. Used to check blood glucose prn. 1 kit 0    Cholecalciferol (Vitamin D3) 50 MCG (2000 UT) capsule Take 1 capsule by mouth Daily.      docusate sodium (COLACE) 100 MG capsule Take 1 capsule by mouth 2 (Two) Times a Day.      empagliflozin (Jardiance) 10 MG tablet tablet Take 1 tablet by mouth Daily. 90 tablet 3    FREESTYLE LITE test strip Used to test blood sugars at least 1 time per day. 100 each 4    Lancets (freestyle) lancets Used to test blood sugars at least 1 time per day. 100 each 3    lisinopril (PRINIVIL,ZESTRIL) 40 MG tablet Take 1 tablet by mouth Daily. 90 tablet 3    Probiotic Product (PROBIOTIC PO) 1 cap po qd      psyllium (METAMUCIL) 0.52 g capsule 0       No current facility-administered medications on file prior to visit.       The following portions of the patient's history were reviewed and updated as appropriate: allergies, current medications, past family history, past medical history, past social history, past surgical history, and problem list.    Review of Systems   Skin:  Positive for rash.       Objective   Physical Exam  Vitals and nursing note reviewed.   Constitutional:       Appearance: Normal appearance. She is well-developed.   HENT:      Head: Normocephalic and atraumatic.      Right " Ear: External ear normal.      Left Ear: External ear normal.      Nose: Nose normal.   Eyes:      Extraocular Movements: Extraocular movements intact.      Pupils: Pupils are equal, round, and reactive to light.   Cardiovascular:      Rate and Rhythm: Normal rate and regular rhythm.      Heart sounds: Normal heart sounds.   Pulmonary:      Effort: Pulmonary effort is normal.      Breath sounds: Normal breath sounds.   Abdominal:      General: Bowel sounds are normal.      Palpations: Abdomen is soft.   Genitourinary:     Vagina: Normal.   Musculoskeletal:         General: Normal range of motion.      Cervical back: Normal range of motion and neck supple.   Skin:     General: Skin is warm and dry.      Findings: Erythema (surrounding umbilicus and in umbilicus) present.   Neurological:      General: No focal deficit present.      Mental Status: She is alert and oriented to person, place, and time.   Psychiatric:         Mood and Affect: Mood normal.         Behavior: Behavior normal.         Judgment: Judgment normal.          Gastrointestinal: The area around the umbilicus is red and irritated.  PHQ-9 Total Score:      Results         Assessment & Plan   Diagnoses and all orders for this visit:    1. Tick bite of abdominal wall, initial encounter (Primary)  -     doxycycline (VIBRAMYCIN) 100 MG capsule; Take 2 capsules by mouth Daily.  Dispense: 2 capsule; Refill: 0    2. Encounter for screening mammogram for malignant neoplasm of breast  -     Mammo Screening Modified Right With CAD; Future             Assessment & Plan  1. Tick bite.  - The area around the umbilicus is red and irritated, but no foreign body is visible.  - A high dose of doxycycline 200 mg once has been prescribed as a preventative measure.  - Advised to take the medication with food, such as applesauce or pudding. The prescription has been sent to the pharmacy. Discussed to she open the capsules to take the medication due to difficulty swallowing  but should verify with the pharmacists.   - If symptoms worsen or if she experiences body aches, fatigue, or signs of infection, she should contact the office immediately for further treatment.    2. Health maintenance.  - A right mammogram screening has been ordered.  - Scheduled for an eye exam in 06/2025.  - Follow-up appointment with Sara in 07/2025, with lab work scheduled two days prior.    Patient Instructions   You are due for adacel Tdap vaccination. (provides protection against tetanus, diptheria and whooping cough) Please  get the immunization at your local pharmacy at your earliest convenience.  Please click on the link for more information about this vaccine.    https://www.cdc.gov/vaccines/vpd/dtap-tdap-td/public/index.html            Patient or patient representative verbalized consent for the use of Ambient Listening during the visit with  JOANNE Irizarry for chart documentation. 5/8/2025  15:35 EDT

## 2025-05-21 ENCOUNTER — HOSPITAL ENCOUNTER (OUTPATIENT)
Dept: MAMMOGRAPHY | Facility: HOSPITAL | Age: 70
Discharge: HOME | End: 2025-05-21
Payer: MEDICARE

## 2025-05-21 DIAGNOSIS — Z12.31 ENCOUNTER FOR SCREENING MAMMOGRAM FOR MALIGNANT NEOPLASM OF BREAST: ICD-10-CM

## 2025-05-21 PROCEDURE — 77063 BREAST TOMOSYNTHESIS BI: CPT

## 2025-05-21 PROCEDURE — 77067 SCR MAMMO BI INCL CAD: CPT

## 2025-05-29 ENCOUNTER — TELEPHONE (OUTPATIENT)
Age: 70
End: 2025-05-29
Payer: MEDICARE

## 2025-05-29 NOTE — TELEPHONE ENCOUNTER
Caller: Sneha Vega    Relationship: Self    Best call back number:     What test/procedure requested: NON CANCER REMOVAL OF SKIN GROWTH     When is it needed: ASAP    Where is the test/procedure going to be performed: WITH [facility]    Additional information or concerns: PATIENT RECEIVED A LETTER OF DENIAL FROM INSURANCE FOR THE NON CANCER REMOVAL OF SKIN GROWTH. PATIENT IS REQUESTING [facility] TO APPEAL THIS DENIAL DUE TO THE LEVEL OF PAIN IT WAS CAUSING A LOT OF PAIN AND AFFECTING THE PATIENT MOBILITY WITH WALKING. PATIENT STATES SHE WOULD LIKE TO BE INFORMED OF EXACTLY WHAT IS BEING SUBMITTED TO INSURANCE TO ENSURE THAT IT INCLUDES THE NEED FOR THIS CARE.

## 2025-06-02 DIAGNOSIS — I10 ESSENTIAL HYPERTENSION: ICD-10-CM

## 2025-06-03 RX ORDER — AMLODIPINE BESYLATE 2.5 MG/1
2.5 TABLET ORAL DAILY
Qty: 90 TABLET | Refills: 1 | Status: SHIPPED | OUTPATIENT
Start: 2025-06-03

## 2025-06-23 ENCOUNTER — TELEPHONE (OUTPATIENT)
Dept: FAMILY MEDICINE CLINIC | Facility: CLINIC | Age: 70
End: 2025-06-23
Payer: MEDICARE

## 2025-06-23 ENCOUNTER — NURSE TRIAGE (OUTPATIENT)
Dept: CALL CENTER | Facility: HOSPITAL | Age: 70
End: 2025-06-23
Payer: MEDICARE

## 2025-06-23 NOTE — TELEPHONE ENCOUNTER
"HUB call Bp has been running low today 94/64, Yesterday 85/60, Today before med was 105/72, I was very tired after taking meds, startign to bottom out daily after meds , just feel very tired, trying to get to Office, has been going on a while I think, did not check BP as much till last few weeks. I have lost weight maybe I need Med adjustment. Soft tr. To office for appt. Maybe med adjustment, pt. Says other than tired is , just weak after taking meds everyday. Triage done, appt.now being made by Magda in office.   Reason for Disposition   [1] Systolic BP  AND [2] taking blood pressure medications AND [3] dizzy, lightheaded or weak    Additional Information   Negative: Started suddenly after an allergic medicine, an allergic food, or bee sting   Negative: Shock suspected (e.g., cold/pale/clammy skin, too weak to stand, low BP, rapid pulse)   Negative: Difficult to awaken or acting confused (e.g., disoriented, slurred speech)   Negative: Fainted   Negative: [1] Systolic BP < 90 AND [2] dizzy, lightheaded, or weak   Negative: Chest pain   Negative: Bleeding (e.g., vomiting blood, rectal bleeding or tarry stools, severe vaginal bleeding)(Exception: fainted from sight of small amount of blood; small cut or abrasion)   Negative: Extra heart beats or heart is beating fast  (i.e., \"palpitations\")   Negative: Sounds like a life-threatening emergency to the triager   Negative: [1] Systolic BP < 80 AND [2] NOT dizzy, lightheaded or weak   Negative: Abdominal pain   Negative: Fever > 100.4 F (38.0 C)   Negative: Major surgery in the past month   Negative: [1] Drinking very little AND [2] dehydration suspected (e.g., no urine > 12 hours, very dry mouth, very lightheaded)   Negative: [1] Fall in systolic BP > 20 mm Hg from normal AND [2] dizzy, lightheaded, or weak   Negative: Patient sounds very sick or weak to the triager   Negative: [1] Systolic BP < 90 AND [2] NOT dizzy, lightheaded or weak   Negative: [1] Systolic " "BP  AND [2] taking blood pressure medications AND [3] NOT dizzy, lightheaded or weak    Answer Assessment - Initial Assessment Questions  1. BLOOD PRESSURE: \"What is the blood pressure?\" \"Did you take at least two measurements 5 minutes apart?\"      94/64  2. ONSET: \"When did you take your blood pressure?\"      Just now  3. HOW: \"How did you obtain the blood pressure?\" (e.g., visiting nurse, automatic home BP monitor)      Automatic cuff  4. HISTORY: \"Do you have a history of low blood pressure?\" \"What is your blood pressure normally?\"      Only after meds recently and lopes stake BP Meds  5. MEDICATIONS: \"Are you taking any medications for blood pressure?\" If yes: \"Have they been changed recently?\"      Take high bp meds  6. PULSE RATE: \"Do you know what your pulse rate is?\"       unknown  7. OTHER SYMPTOMS: \"Have you been sick recently?\" \"Have you had a recent injury?\"      no  8. PREGNANCY: \"Is there any chance you are pregnant?\" \"When was your last menstrual period?\"      no    Protocols used: Low Blood Pressure-ADULT-AH    "

## 2025-06-23 NOTE — TELEPHONE ENCOUNTER
Pt called and had concerns about her BP. Pt states she recently has lost a bit of weight. She is now weighing in at 156lbs. Pt states her BP before taking BP medications is around 105/72. After taking her meds she is getting readings of 85/60. When readings are that low she is experiencing feelings of weakness, fatigue, and lightheadedness. She said she was going to stop the amlodipine and continue with the lisinopril. I advised against this until instructions by the prescribing provider were given. Pt wants to know if she needs to stop one or the other, or get a dose adjustment.

## 2025-06-24 NOTE — TELEPHONE ENCOUNTER
Spoke w/ pt to see if she had any questions about the instructions given for the lisinopril. She had no questions and was happy with trying this new medication regimen for now. Will continue to monitor BP and was instructed to let us know if she continues to have low readings.

## 2025-06-30 ENCOUNTER — TELEPHONE (OUTPATIENT)
Dept: FAMILY MEDICINE CLINIC | Facility: CLINIC | Age: 70
End: 2025-06-30
Payer: MEDICARE

## 2025-06-30 RX ORDER — LISINOPRIL 20 MG/1
20 TABLET ORAL DAILY
Qty: 90 TABLET | Refills: 0 | Status: SHIPPED | OUTPATIENT
Start: 2025-06-30

## 2025-06-30 NOTE — TELEPHONE ENCOUNTER
Caller: Sneha Vega    Relationship: Self    Best call back number: [phone]     Which medication are you concerned about: lisinopril (PRINIVIL,ZESTRIL) 40 MG tablet     Who prescribed you this medication: CARLEE    When did you start taking this medication: WEEK (HALF PILL)    What are your concerns: PATIENT WOULD LIKE A SCRIPT FOR 20 MG LISINOPRIL DUE TO THE FACT THAT PILLS ARE NOT SCORED AND SHE CAN NOT GET THEM  A HALF CUT (OF 40 MG)ACCURATELY.

## 2025-07-10 ENCOUNTER — TELEPHONE (OUTPATIENT)
Dept: FAMILY MEDICINE CLINIC | Facility: CLINIC | Age: 70
End: 2025-07-10
Payer: MEDICARE

## 2025-07-10 RX ORDER — LISINOPRIL 10 MG/1
10 TABLET ORAL DAILY
Qty: 30 TABLET | Refills: 6 | Status: SHIPPED | OUTPATIENT
Start: 2025-07-10

## 2025-07-10 NOTE — TELEPHONE ENCOUNTER
This patient called regarding her blood pressure. In the mornings, before she takes her medicine, she checks her B/P. Yesterday it was 88/59 and today it was 98/70. Patient is worried that with having that low of b/p, without medicine, should she still be taking her medicine? Her B/P at night is usually around 110/70.

## 2025-07-10 NOTE — TELEPHONE ENCOUNTER
On her list it shows amlodipine 2.5 mg and lisinopril 20 mg.  Please verify that she is taking both of these, and if so instruct her to cut the lisinopril in half and continue amlodipine.  Asked her to continue to monitor her blood pressure for the next week and if it is still running low I will cut the lisinopril down further, and may have to stop one of them if it continues to run low.  Also make sure that she is hydrating well.

## 2025-07-10 NOTE — TELEPHONE ENCOUNTER
Sneha Vega notified and voiced comprehension and understanding.    Patient asking for a 30 day supply of the 10 mg lisinopril, due to the pill being small, with no score tevin, and will be difficult to cut in half.

## 2025-07-16 ENCOUNTER — LAB (OUTPATIENT)
Dept: FAMILY MEDICINE CLINIC | Facility: CLINIC | Age: 70
End: 2025-07-16
Payer: MEDICARE

## 2025-07-16 DIAGNOSIS — E11.65 TYPE 2 DIABETES MELLITUS WITH HYPERGLYCEMIA, WITHOUT LONG-TERM CURRENT USE OF INSULIN: ICD-10-CM

## 2025-07-16 DIAGNOSIS — I10 ESSENTIAL HYPERTENSION: Primary | ICD-10-CM

## 2025-07-16 LAB
ALBUMIN SERPL-MCNC: 4.2 G/DL (ref 3.5–5.2)
ALBUMIN/GLOB SERPL: 1.6 G/DL
ALP SERPL-CCNC: 105 U/L (ref 39–117)
ALT SERPL W P-5'-P-CCNC: 9 U/L (ref 1–33)
ANION GAP SERPL CALCULATED.3IONS-SCNC: 10.3 MMOL/L (ref 5–15)
AST SERPL-CCNC: 16 U/L (ref 1–32)
BASOPHILS # BLD AUTO: 0.03 10*3/MM3 (ref 0–0.2)
BASOPHILS NFR BLD AUTO: 0.5 % (ref 0–1.5)
BILIRUB SERPL-MCNC: 0.3 MG/DL (ref 0–1.2)
BUN SERPL-MCNC: 15 MG/DL (ref 8–23)
BUN/CREAT SERPL: 18.1 (ref 7–25)
CALCIUM SPEC-SCNC: 9.6 MG/DL (ref 8.6–10.5)
CHLORIDE SERPL-SCNC: 102 MMOL/L (ref 98–107)
CHOLEST SERPL-MCNC: 178 MG/DL (ref 0–200)
CO2 SERPL-SCNC: 23.7 MMOL/L (ref 22–29)
CREAT SERPL-MCNC: 0.83 MG/DL (ref 0.57–1)
DEPRECATED RDW RBC AUTO: 40.7 FL (ref 37–54)
EGFRCR SERPLBLD CKD-EPI 2021: 75.9 ML/MIN/1.73
EOSINOPHIL # BLD AUTO: 0.12 10*3/MM3 (ref 0–0.4)
EOSINOPHIL NFR BLD AUTO: 2 % (ref 0.3–6.2)
ERYTHROCYTE [DISTWIDTH] IN BLOOD BY AUTOMATED COUNT: 12.8 % (ref 12.3–15.4)
GLOBULIN UR ELPH-MCNC: 2.7 GM/DL
GLUCOSE SERPL-MCNC: 125 MG/DL (ref 65–99)
HBA1C MFR BLD: 7.7 % (ref 4.8–5.6)
HCT VFR BLD AUTO: 41 % (ref 34–46.6)
HDLC SERPL-MCNC: 61 MG/DL (ref 40–60)
HGB BLD-MCNC: 13.4 G/DL (ref 12–15.9)
IMM GRANULOCYTES # BLD AUTO: 0.02 10*3/MM3 (ref 0–0.05)
IMM GRANULOCYTES NFR BLD AUTO: 0.3 % (ref 0–0.5)
LDLC SERPL CALC-MCNC: 104 MG/DL (ref 0–100)
LDLC/HDLC SERPL: 1.69 {RATIO}
LYMPHOCYTES # BLD AUTO: 1.97 10*3/MM3 (ref 0.7–3.1)
LYMPHOCYTES NFR BLD AUTO: 32.7 % (ref 19.6–45.3)
MCH RBC QN AUTO: 29.1 PG (ref 26.6–33)
MCHC RBC AUTO-ENTMCNC: 32.7 G/DL (ref 31.5–35.7)
MCV RBC AUTO: 89.1 FL (ref 79–97)
MONOCYTES # BLD AUTO: 0.47 10*3/MM3 (ref 0.1–0.9)
MONOCYTES NFR BLD AUTO: 7.8 % (ref 5–12)
NEUTROPHILS NFR BLD AUTO: 3.41 10*3/MM3 (ref 1.7–7)
NEUTROPHILS NFR BLD AUTO: 56.7 % (ref 42.7–76)
NRBC BLD AUTO-RTO: 0 /100 WBC (ref 0–0.2)
PLATELET # BLD AUTO: 306 10*3/MM3 (ref 140–450)
PMV BLD AUTO: 10.4 FL (ref 6–12)
POTASSIUM SERPL-SCNC: 5.2 MMOL/L (ref 3.5–5.2)
PROT SERPL-MCNC: 6.9 G/DL (ref 6–8.5)
RBC # BLD AUTO: 4.6 10*6/MM3 (ref 3.77–5.28)
SODIUM SERPL-SCNC: 136 MMOL/L (ref 136–145)
TRIGL SERPL-MCNC: 71 MG/DL (ref 0–150)
TSH SERPL DL<=0.05 MIU/L-ACNC: 0.89 UIU/ML (ref 0.27–4.2)
VLDLC SERPL-MCNC: 13 MG/DL (ref 5–40)
WBC NRBC COR # BLD AUTO: 6.02 10*3/MM3 (ref 3.4–10.8)

## 2025-07-16 PROCEDURE — 83036 HEMOGLOBIN GLYCOSYLATED A1C: CPT | Performed by: NURSE PRACTITIONER

## 2025-07-16 PROCEDURE — 84443 ASSAY THYROID STIM HORMONE: CPT | Performed by: NURSE PRACTITIONER

## 2025-07-16 PROCEDURE — 80053 COMPREHEN METABOLIC PANEL: CPT | Performed by: NURSE PRACTITIONER

## 2025-07-16 PROCEDURE — 85025 COMPLETE CBC W/AUTO DIFF WBC: CPT | Performed by: NURSE PRACTITIONER

## 2025-07-16 PROCEDURE — 80061 LIPID PANEL: CPT | Performed by: NURSE PRACTITIONER

## 2025-07-16 PROCEDURE — 36415 COLL VENOUS BLD VENIPUNCTURE: CPT | Performed by: NURSE PRACTITIONER

## 2025-07-22 ENCOUNTER — OFFICE VISIT (OUTPATIENT)
Dept: FAMILY MEDICINE CLINIC | Facility: CLINIC | Age: 70
End: 2025-07-22
Payer: MEDICARE

## 2025-07-22 VITALS
WEIGHT: 155 LBS | HEIGHT: 62 IN | HEART RATE: 60 BPM | BODY MASS INDEX: 28.52 KG/M2 | TEMPERATURE: 96.8 F | SYSTOLIC BLOOD PRESSURE: 121 MMHG | DIASTOLIC BLOOD PRESSURE: 78 MMHG | OXYGEN SATURATION: 98 %

## 2025-07-22 DIAGNOSIS — Z00.00 MEDICARE ANNUAL WELLNESS VISIT, SUBSEQUENT: Primary | ICD-10-CM

## 2025-07-22 DIAGNOSIS — Z12.4 CERVICAL CANCER SCREENING: ICD-10-CM

## 2025-07-22 DIAGNOSIS — I10 ESSENTIAL HYPERTENSION: ICD-10-CM

## 2025-07-22 DIAGNOSIS — E78.2 MIXED HYPERLIPIDEMIA: ICD-10-CM

## 2025-07-22 DIAGNOSIS — E11.65 TYPE 2 DIABETES MELLITUS WITH HYPERGLYCEMIA, WITHOUT LONG-TERM CURRENT USE OF INSULIN: ICD-10-CM

## 2025-07-22 DIAGNOSIS — R35.0 URINARY FREQUENCY: ICD-10-CM

## 2025-07-22 DIAGNOSIS — B37.31 CANDIDA VAGINITIS: ICD-10-CM

## 2025-07-22 LAB
BILIRUB BLD-MCNC: NEGATIVE MG/DL
CLARITY, POC: CLEAR
COLOR UR: YELLOW
EXPIRATION DATE: ABNORMAL
GLUCOSE UR STRIP-MCNC: ABNORMAL MG/DL
KETONES UR QL: NEGATIVE
LEUKOCYTE EST, POC: NEGATIVE
Lab: ABNORMAL
NITRITE UR-MCNC: NEGATIVE MG/ML
PH UR: 5.5 [PH] (ref 5–8)
PROT UR STRIP-MCNC: NEGATIVE MG/DL
RBC # UR STRIP: NEGATIVE /UL
SP GR UR: 1 (ref 1–1.03)
UROBILINOGEN UR QL: NORMAL

## 2025-07-22 PROCEDURE — 99214 OFFICE O/P EST MOD 30 MIN: CPT | Performed by: NURSE PRACTITIONER

## 2025-07-22 PROCEDURE — 81003 URINALYSIS AUTO W/O SCOPE: CPT | Performed by: NURSE PRACTITIONER

## 2025-07-22 PROCEDURE — 3074F SYST BP LT 130 MM HG: CPT | Performed by: NURSE PRACTITIONER

## 2025-07-22 PROCEDURE — 1126F AMNT PAIN NOTED NONE PRSNT: CPT | Performed by: NURSE PRACTITIONER

## 2025-07-22 PROCEDURE — G0439 PPPS, SUBSEQ VISIT: HCPCS | Performed by: NURSE PRACTITIONER

## 2025-07-22 PROCEDURE — 3078F DIAST BP <80 MM HG: CPT | Performed by: NURSE PRACTITIONER

## 2025-07-22 PROCEDURE — 3051F HG A1C>EQUAL 7.0%<8.0%: CPT | Performed by: NURSE PRACTITIONER

## 2025-07-22 RX ORDER — BACILLUS COAGULANS/INULIN 1B-250 MG
CAPSULE ORAL
COMMUNITY

## 2025-07-22 RX ORDER — LISINOPRIL 5 MG/1
5 TABLET ORAL DAILY
Qty: 90 TABLET | OUTPATIENT
Start: 2025-07-22

## 2025-07-22 RX ORDER — LISINOPRIL 5 MG/1
5 TABLET ORAL DAILY
Qty: 30 TABLET | Refills: 3 | Status: SHIPPED | OUTPATIENT
Start: 2025-07-22

## 2025-07-22 RX ORDER — FLUCONAZOLE 150 MG/1
150 TABLET ORAL DAILY
Qty: 2 TABLET | Refills: 0 | Status: SHIPPED | OUTPATIENT
Start: 2025-07-22

## 2025-07-22 RX ORDER — CHOLECALCIFEROL (VITAMIN D3) 25 MCG
CAPSULE ORAL
COMMUNITY

## 2025-07-22 NOTE — PROGRESS NOTES
Subjective   The ABCs of the Annual Wellness Visit  Medicare Wellness Visit      Sneha Vega is a 70 y.o. patient who presents for a Medicare Wellness Visit.    The following portions of the patient's history were reviewed and   updated as appropriate: allergies, current medications, past family history, past medical history, past social history, past surgical history, and problem list.    Compared to one year ago, the patient's physical   health is better.  Compared to one year ago, the patient's mental   health is the same.    Recent Hospitalizations:  She was not admitted to the hospital during the last year.     Current Medical Providers:  Patient Care Team:  Sara Feliz APRN as PCP - General (Nurse Practitioner)  Prema Snow MD as Consulting Physician (Endocrinology)  Melanie Gibson MD as Consulting Physician (Otolaryngology)  Stew Campbell MD as Surgeon (General Surgery)  Blake Martinez as Consulting Physician (Gastroenterology)  Jimbo Roman II, MD as Consulting Physician (Orthopedic Surgery)  Idalia Baxter MD (Dermatology)  [facility] (Optometry)  Dr. Eric Garcia (Optometry)  Shari Daniel APRN as Nurse Practitioner (Orthopedic Surgery)  Earnest Crane MD as Consulting Physician (Orthopedic Surgery)    Outpatient Medications Prior to Visit   Medication Sig Dispense Refill    amLODIPine (NORVASC) 2.5 MG tablet TAKE 1 TABLET BY MOUTH DAILY 90 tablet 1    Bacillus Coagulans-Inulin (Probiotic) 1-250 BILLION-MG capsule       Blood Glucose Monitoring Suppl (FreeStyle Lite) w/Device kit Use 1 Device Daily. Used to check blood glucose prn. 1 kit 0    Cholecalciferol (Vitamin D-3) 25 MCG (1000 UT) capsule       Cholecalciferol (Vitamin D3) 50 MCG (2000 UT) capsule Take 1 capsule by mouth Daily.      empagliflozin (Jardiance) 10 MG tablet tablet Take 1 tablet by mouth Daily. 90 tablet 3    FREESTYLE LITE test strip Used to test blood sugars at least 1 time per day. 100  "each 4    Lancets (freestyle) lancets Used to test blood sugars at least 1 time per day. 100 each 3    Probiotic Product (PROBIOTIC PO) 1 cap po qd      psyllium (METAMUCIL) 0.52 g capsule 0      lisinopril (PRINIVIL,ZESTRIL) 10 MG tablet Take 1 tablet by mouth Daily. 30 tablet 6    docusate sodium (COLACE) 100 MG capsule Take 1 capsule by mouth 2 (Two) Times a Day.      doxycycline (VIBRAMYCIN) 100 MG capsule Take 2 capsules by mouth Daily. 2 capsule 0     No facility-administered medications prior to visit.     No opioid medication identified on active medication list. I have reviewed chart for other potential  high risk medication/s and harmful drug interactions in the elderly.      Aspirin is not on active medication list.  Aspirin use is not indicated based on review of current medical condition/s. Risk of harm outweighs potential benefits.  .    Patient Active Problem List   Diagnosis    Type 2 diabetes mellitus with hyperglycemia, without long-term current use of insulin    Mixed hyperlipidemia    Essential hypertension    Chronic neck pain    Chronic bilateral low back pain without sciatica    Other dysphagia    Anxiety and depression    Laryngospasms    Vocal cord dysfunction    Occasional tremors    Onychomycosis    Osteopenia of multiple sites    Lymphocytic colitis     Advance Care Planning Advance Directive is not on file.  ACP discussion was held with the patient during this visit. Patient has an advance directive (not in EMR), copy requested.            Objective   Vitals:    07/22/25 1050   BP: 121/78   BP Location: Right arm   Patient Position: Sitting   Cuff Size: Adult   Pulse: 60   Temp: 96.8 °F (36 °C)   SpO2: 98%   Weight: 70.3 kg (155 lb)   Height: 157.5 cm (62.01\")   PainSc: 0-No pain       Estimated body mass index is 28.34 kg/m² as calculated from the following:    Height as of this encounter: 157.5 cm (62.01\").    Weight as of this encounter: 70.3 kg (155 lb).    BMI is >= 25 and <30. " (Overweight) The following options were offered after discussion;: exercise counseling/recommendations           Does the patient have evidence of cognitive impairment? No  Lab Results   Component Value Date    TRIG 71 2025    HDL 61 (H) 2025     (H) 2025    VLDL 13 2025    HGBA1C 7.70 (H) 2025                                                                                                Health  Risk Assessment    Smoking Status:  Social History     Tobacco Use   Smoking Status Former    Current packs/day: 0.00    Average packs/day: 1 pack/day for 21.2 years (21.2 ttl pk-yrs)    Types: Cigarettes    Start date:     Quit date: 3/4/2001    Years since quittin.4    Passive exposure: Past   Smokeless Tobacco Never     Alcohol Consumption:  Social History     Substance and Sexual Activity   Alcohol Use Not Currently       Fall Risk Screen  STEADI Fall Risk Assessment was completed, and patient is at LOW risk for falls.Assessment completed on:2025    Depression Screening   Little interest or pleasure in doing things? Not at all   Feeling down, depressed, or hopeless? Not at all   PHQ-2 Total Score 0      Health Habits and Functional and Cognitive Screenin/22/2025    10:56 AM   Functional & Cognitive Status   Do you have difficulty preparing food and eating? No   Do you have difficulty bathing yourself, getting dressed or grooming yourself? No   Do you have difficulty using the toilet? No   Do you have difficulty moving around from place to place? No   Do you have trouble with steps or getting out of a bed or a chair? No   Current Diet Well Balanced Diet   Dental Exam Up to date   Eye Exam Up to date   Exercise (times per week) 5 times per week   Current Exercises Include Walking   Do you need help using the phone?  No   Are you deaf or do you have serious difficulty hearing?  No   Do you need help to go to places out of walking distance? No   Do you need help  shopping? No   Do you need help preparing meals?  No   Do you need help with housework?  No   Do you need help with laundry? No   Do you need help taking your medications? No   Do you need help managing money? No   Do you ever drive or ride in a car without wearing a seat belt? No   Have you felt unusual fatigue (could be tiredness), stress, anger or loneliness in the last month? No   Who do you live with? Other   If you need help, do you have trouble finding someone available to you? No   Have you been bothered in the last four weeks by sexual problems? No   Do you have difficulty concentrating, remembering or making decisions? No           Age-appropriate Screening Schedule:  Refer to the list below for future screening recommendations based on patient's age, sex and/or medical conditions. Orders for these recommended tests are listed in the plan section. The patient has been provided with a written plan.    Health Maintenance List  Health Maintenance   Topic Date Due    TDAP/TD VACCINES (1 - Tdap) Never done    HEPATITIS C SCREENING  Never done    COVID-19 Vaccine (6 - 2024-25 season) 01/03/2026 (Originally 9/1/2024)    INFLUENZA VACCINE  10/01/2025    HEMOGLOBIN A1C  01/16/2026    DIABETIC FOOT EXAM  03/04/2026    DXA SCAN  03/26/2026    URINE MICROALBUMIN-CREATININE RATIO (uACR)  04/10/2026    LIPID PANEL  07/16/2026    DIABETIC EYE EXAM  07/17/2026    ANNUAL WELLNESS VISIT  07/22/2026    MAMMOGRAM  05/21/2027    COLORECTAL CANCER SCREENING  06/09/2030    Pneumococcal Vaccine 50+  Completed    AAA SCREEN ONCE  Completed    ZOSTER VACCINE  Completed                                                                                                                                                CMS Preventative Services Quick Reference  Risk Factors Identified During Encounter  Fall Risk-High or Moderate: Discussed Fall Prevention in the home  Immunizations Discussed/Encouraged: Tdap and RSV (Respiratory Syncytial  Virus)    The above risks/problems have been discussed with the patient.  Pertinent information has been shared with the patient in the After Visit Summary.  An After Visit Summary and PPPS were made available to the patient.    Follow Up:   Next Medicare Wellness visit to be scheduled in 1 year.         Additional E&M Note during same encounter follows:  Patient has additional, significant, and separately identifiable condition(s)/problem(s) that require work above and beyond the Medicare Wellness Visit     Chief Complaint  Medicare Wellness-subsequent    Subjective    HPI  Sneha is also being seen today for additional medical problem/s.       The patient presents for a Medicare wellness visit.    She reports an improvement in her physical health following surgery in 12/2024, which required a 2-night hospital stay. Her mental health remains stable. She is not currently on aspirin. She has an advanced directive and a living will in place. She maintains an active lifestyle on [place]acre property. She is currently undergoing physical therapy for a torn rotator cuff, which was diagnosed via MRI as an intermediate tear in the supraspinatus and bicep. The tear occurred spontaneously about a month after her surgery in 12/2024.    She has not received the RSV vaccine due to concerns about potential adverse reactions, as she experienced bowel issues for 2 weeks after her last influenza vaccine. She also had a severe reaction to the COVID-19 vaccine in 2022, which resulted in hospitalization due to heart and blood pressure complications. She has received the pneumonia vaccine and continues to get the influenza vaccine. She has a history of hives triggered by various factors. She experienced a severe case of poison ivy and hives while reducing her lisinopril dosage and undergoing physical therapy, leading her to question if the medication adjustment affected her immune system. The hives persisted for 3 weeks, even after  discontinuing Brazil nuts, which she had previously tolerated well. She was informed that some medications have coatings that can cause allergic reactions, leading her to wonder if the switch from lisinopril 40 mg to 20 mg to 10 mg could have triggered the hives. She has not consulted anyone regarding her lab results. She recalls having an abnormal Pap smear over 24 years ago, but subsequent tests were normal.    She has been experiencing difficulty emptying her bladder completely, requiring her to shift positions, and has noticed an increase in urinary frequency. She also reports itching and occasional discharge, which she believes started before she began taking Jardiance. A previous nurse practitioner prescribed medication for a yeast infection, which seemed to help as the symptoms are intermittent. She has been diligent about personal hygiene, washing with soap and water daily and using wipes throughout the day. She recently switched to baby wipes instead of toilet paper, which seems to have alleviated the symptoms. However, she experienced itching again last night, which improved this morning. She continues to take probiotics and is considering a Pap smear due to the intermittent itching and discharge. She has used Diflucan in the past.    She has been monitoring her blood pressure at home, recording readings both before and after taking her blood pressure medication. She reports no lightheadedness or dizziness but did feel fatigued when her blood pressure was 96/58 yesterday. She has experienced dizziness in the past, but these episodes have ceased since she reduced her lisinopril dosage.    Her A1c levels have improved, and she is scheduled to see Dr. Flor in a few weeks.    PAST SURGICAL HISTORY:  Surgery in 12/2024  Review of Systems   Genitourinary:  Positive for frequency and vaginal discharge.        Vaginal itching          Objective   Vital Signs:  /78 (BP Location: Right arm, Patient Position:  "Sitting, Cuff Size: Adult)   Pulse 60   Temp 96.8 °F (36 °C)   Ht 157.5 cm (62.01\")   Wt 70.3 kg (155 lb)   SpO2 98%   BMI 28.34 kg/m²   Physical Exam  Vitals and nursing note reviewed. Exam conducted with a chaperone present.   Constitutional:       Appearance: Normal appearance. She is well-developed.   HENT:      Head: Normocephalic and atraumatic.      Right Ear: External ear normal.      Left Ear: External ear normal.      Nose: Nose normal.   Eyes:      Extraocular Movements: Extraocular movements intact.      Pupils: Pupils are equal, round, and reactive to light.   Cardiovascular:      Rate and Rhythm: Normal rate and regular rhythm.      Heart sounds: Normal heart sounds.   Pulmonary:      Effort: Pulmonary effort is normal.      Breath sounds: Normal breath sounds.   Abdominal:      General: Bowel sounds are normal.      Palpations: Abdomen is soft.   Genitourinary:     Exam position: Lithotomy position.      Vagina: Vaginal discharge and erythema present.      Cervix: Normal.      Uterus: Normal.       Adnexa: Right adnexa normal and left adnexa normal.      Rectum: Normal.      Comments: Erythema of labia minora  Musculoskeletal:         General: Normal range of motion.      Cervical back: Normal range of motion and neck supple.   Skin:     General: Skin is warm and dry.   Neurological:      General: No focal deficit present.      Mental Status: She is alert and oriented to person, place, and time.   Psychiatric:         Mood and Affect: Mood normal.         Behavior: Behavior normal.         Judgment: Judgment normal.           Respiratory: Clear to auscultation, no wheezing, rales or rhonchi  Skin: Very irritated skin in the groin area  Pelvic: Irritated skin externally and internally, presence of discharge            Results  Labs   - A1c: Better    Imaging   - MRI of the supraspinatus and bicep: 12/2024, Intermediate tear on the supraspinatus and a little bit on the bicep           Assessment " and Plan      Medicare annual wellness visit, subsequent         Type 2 diabetes mellitus with hyperglycemia, without long-term current use of insulin           Essential hypertension      Orders:    lisinopril (PRINIVIL,ZESTRIL) 5 MG tablet; Take 1 tablet by mouth Daily.    Mixed hyperlipidemia            Urinary frequency    Orders:    POCT urinalysis dipstick, automated    Candida vaginitis    Orders:    fluconazole (Diflucan) 150 MG tablet; Take 1 tablet by mouth Daily. Repeat second dose in 72 hours    Cervical cancer screening    Orders:    IGP,Aptima HPV,Age Gdln           1. Medicare wellness visit.  - Her A1c levels have shown improvement.  - She is due for a tetanus vaccine, which can be administered at the pharmacy.  - She is advised to provide a copy of her advanced directive or living will for inclusion in her medical records.  - She reports being physically active and is currently undergoing physical therapy for a torn rotator cuff.    2. Yeast infection.  - She reports intermittent itching and discharge, which may be related to her use of Jardiance.  - A Pap smear will be conducted today, and a urine sample will be collected to rule out a UTI.  - She is advised to continue taking probiotics or consume yogurt daily.  - A prescription for Diflucan 150 mg, to be taken twice with a 3-day interval, has been provided. If the urine test confirms a UTI, appropriate treatment will be initiated.    3. Blood pressure management.  - Her blood pressure readings are within the normal range today.  - The dosage of lisinopril will be reduced to 5 mg, with instructions to halve the current 10 mg tablets.  - She is advised to monitor her blood pressure and report any significant changes.  - She reports no episodes of dizziness since decreasing the lisinopril dosage.    4. Shoulder pain.  - She is currently undergoing physical therapy for a torn rotator cuff, which was diagnosed via MRI as an intermediate tear in the  supraspinatus and bicep.  - She reports soreness that began about a month after her surgery in December.  - The condition is being managed with physical therapy.  - No new medications or referrals are indicated at this time.    5. Diabetes mellitus.  - Her A1c levels have shown improvement.  - She is scheduled to see Dr. Flor in a few weeks.  - The improvement in A1c is noted post-surgery, and dietary adjustments are being considered.  - Further management will be discussed with Dr. Flor.    Follow-up: A follow-up visit is scheduled in 6 months, or earlier if necessary.    PROCEDURE  Procedure: Pap smear  - Procedural Discussion: Discussed the patient's intermittent itching and discharge. Decided to perform a Pap smear to investigate the cause of the irritation and discharge.  - Technique: Performed Pap smear by inserting a speculum and collecting specimens from the cervix.  - Post-Procedural Discussion: Discussed the findings of irritation and discharge. Planned to treat for a yeast infection and check the urine for UTI.           Follow Up   Return in about 6 months (around 1/22/2026).  Patient was given instructions and counseling regarding her condition or for health maintenance advice. Please see specific information pulled into the AVS if appropriate.  Patient or patient representative verbalized consent for the use of Ambient Listening during the visit with  JOANNE Meehan for chart documentation. 7/28/2025  11:35 EDT

## 2025-07-24 LAB
AGE GDLN ACOG TESTING: NORMAL
CYTOLOGIST CVX/VAG CYTO: NORMAL
CYTOLOGY CVX/VAG DOC CYTO: NORMAL
CYTOLOGY CVX/VAG DOC THIN PREP: NORMAL
DX ICD CODE: NORMAL
OTHER STN SPEC: NORMAL
SERVICE CMNT-IMP: NORMAL
STAT OF ADQ CVX/VAG CYTO-IMP: NORMAL

## 2025-07-28 NOTE — ASSESSMENT & PLAN NOTE
{Hypertension is (optional):[id number]}    Orders:    lisinopril (PRINIVIL,ZESTRIL) 5 MG tablet; Take 1 tablet by mouth Daily.

## 2025-07-29 ENCOUNTER — TELEPHONE (OUTPATIENT)
Dept: FAMILY MEDICINE CLINIC | Facility: CLINIC | Age: 70
End: 2025-07-29
Payer: MEDICARE

## 2025-07-29 NOTE — TELEPHONE ENCOUNTER
Caller: Sneha Vega    Relationship: Self    Best call back number:     [phone] (Mobile)     What was the call regarding: PATIENT IS WANTING TO DISCUSS HER BLOOD PRESSURE MEDICATION / LISINOPRIL 5MG    HER BLOOD PRESSURE WAS INCREASING, SO SHE TOOK ANOTHER 5MG LISINOPRIL FOR A TOTAL OF 10MG     HER READINGS HAVE BEEN AROUND   142/91  142/88      CAN YOU PLEASE CALL HER AND DISCUSS WHAT SHE NEEDS TO DO?         Is it okay if the provider responds through MyChart:CALL BACK

## 2025-07-29 NOTE — TELEPHONE ENCOUNTER
Instruct her to continue 10 mg of lisinopril and continue to monitor blood pressure.  If I need to resend a 10 mg prescription, I can do that.

## 2025-08-14 ENCOUNTER — OFFICE VISIT (OUTPATIENT)
Dept: ENDOCRINOLOGY | Facility: CLINIC | Age: 70
End: 2025-08-14
Payer: MEDICARE

## 2025-08-14 VITALS
BODY MASS INDEX: 28.01 KG/M2 | SYSTOLIC BLOOD PRESSURE: 120 MMHG | HEART RATE: 65 BPM | DIASTOLIC BLOOD PRESSURE: 72 MMHG | WEIGHT: 152.2 LBS | HEIGHT: 62 IN | OXYGEN SATURATION: 96 %

## 2025-08-14 DIAGNOSIS — E11.65 TYPE 2 DIABETES MELLITUS WITH HYPERGLYCEMIA, WITHOUT LONG-TERM CURRENT USE OF INSULIN: Primary | ICD-10-CM

## 2025-08-14 DIAGNOSIS — I10 ESSENTIAL HYPERTENSION: ICD-10-CM

## 2025-08-14 DIAGNOSIS — E78.2 MIXED HYPERLIPIDEMIA: ICD-10-CM

## 2025-08-14 PROCEDURE — G2211 COMPLEX E/M VISIT ADD ON: HCPCS | Performed by: INTERNAL MEDICINE

## 2025-08-14 PROCEDURE — 3078F DIAST BP <80 MM HG: CPT | Performed by: INTERNAL MEDICINE

## 2025-08-14 PROCEDURE — 1160F RVW MEDS BY RX/DR IN RCRD: CPT | Performed by: INTERNAL MEDICINE

## 2025-08-14 PROCEDURE — 99214 OFFICE O/P EST MOD 30 MIN: CPT | Performed by: INTERNAL MEDICINE

## 2025-08-14 PROCEDURE — 3051F HG A1C>EQUAL 7.0%<8.0%: CPT | Performed by: INTERNAL MEDICINE

## 2025-08-14 PROCEDURE — 3074F SYST BP LT 130 MM HG: CPT | Performed by: INTERNAL MEDICINE

## 2025-08-14 PROCEDURE — 1159F MED LIST DOCD IN RCRD: CPT | Performed by: INTERNAL MEDICINE

## 2025-08-14 RX ORDER — MELOXICAM 15 MG/1
TABLET ORAL
COMMUNITY
Start: 2025-07-28